# Patient Record
Sex: FEMALE | Race: WHITE | NOT HISPANIC OR LATINO | Employment: UNEMPLOYED | URBAN - METROPOLITAN AREA
[De-identification: names, ages, dates, MRNs, and addresses within clinical notes are randomized per-mention and may not be internally consistent; named-entity substitution may affect disease eponyms.]

---

## 2017-07-27 ENCOUNTER — HISTORICAL (OUTPATIENT)
Dept: LAB | Facility: HOSPITAL | Age: 47
End: 2017-07-27

## 2017-07-27 LAB
ABS NEUT (OLG): 2.3 X10(3)/MCL (ref 1.5–6.9)
ALBUMIN SERPL-MCNC: 4 GM/DL (ref 3.4–5)
ALBUMIN/GLOB SERPL: 1.2 RATIO
ALP SERPL-CCNC: 71 UNIT/L (ref 30–113)
ALT SERPL-CCNC: 19 UNIT/L (ref 10–45)
AST SERPL-CCNC: 14 UNIT/L (ref 15–37)
BILIRUB SERPL-MCNC: 0.4 MG/DL (ref 0.1–0.9)
BILIRUBIN DIRECT+TOT PNL SERPL-MCNC: 0.1 MG/DL (ref 0–0.3)
BILIRUBIN DIRECT+TOT PNL SERPL-MCNC: 0.3 MG/DL
BUN SERPL-MCNC: 13 MG/DL (ref 10–20)
CALCIUM SERPL-MCNC: 8.7 MG/DL (ref 8–10.5)
CHLORIDE SERPL-SCNC: 107 MMOL/L (ref 100–108)
CHOLEST SERPL-MCNC: 175 MG/DL (ref 140–200)
CHOLEST/HDLC SERPL: 4 MG/DL (ref 35–59)
CO2 SERPL-SCNC: 25 MMOL/L (ref 21–35)
CREAT SERPL-MCNC: 0.46 MG/DL (ref 0.7–1.3)
ERYTHROCYTE [DISTWIDTH] IN BLOOD BY AUTOMATED COUNT: 13.9 % (ref 11.5–17)
FT4I SERPL CALC-MCNC: 2.8 (ref 5.9–13.1)
GLOBULIN SER-MCNC: 3.4 GM/DL
GLUCOSE SERPL-MCNC: 87 MG/DL (ref 75–116)
HCT VFR BLD AUTO: 31.2 % (ref 36–48)
HDLC SERPL-MCNC: 47 MG/DL (ref 35–59)
HGB BLD-MCNC: 9.1 GM/DL (ref 12–16)
LDLC SERPL CALC-MCNC: 113 MG/DL (ref 100–129)
MCH RBC QN AUTO: 23 PG (ref 27–34)
MCHC RBC AUTO-ENTMCNC: 29 GM/DL (ref 31–36)
MCV RBC AUTO: 77 FL (ref 80–99)
PLATELET # BLD AUTO: 315 X10(3)/MCL (ref 140–400)
PMV BLD AUTO: 10.6 FL (ref 6.8–10)
POTASSIUM SERPL-SCNC: 4.6 MMOL/L (ref 3.6–5.2)
PROT SERPL-MCNC: 7.4 GM/DL (ref 6.4–8.2)
RBC # BLD AUTO: 4.03 X10(6)/MCL (ref 4.2–5.4)
SODIUM SERPL-SCNC: 142 MMOL/L (ref 135–145)
T3RU NFR SERPL: 37 % (ref 30–39)
T4 SERPL-MCNC: 7.5 MCG/DL (ref 5.3–11.5)
TRIGL SERPL-MCNC: 57 MG/DL (ref 35–150)
TSH SERPL-ACNC: 0.92 MIU/ML (ref 0.36–3.74)
VLDLC SERPL CALC-MCNC: 11 MG/DL
WBC # SPEC AUTO: 4 X10(3)/MCL (ref 4.5–11.5)

## 2017-08-10 ENCOUNTER — HISTORICAL (OUTPATIENT)
Dept: RADIOLOGY | Facility: HOSPITAL | Age: 47
End: 2017-08-10

## 2018-08-07 ENCOUNTER — HISTORICAL (OUTPATIENT)
Dept: RADIOLOGY | Facility: HOSPITAL | Age: 48
End: 2018-08-07

## 2018-08-27 ENCOUNTER — HISTORICAL (OUTPATIENT)
Dept: LAB | Facility: HOSPITAL | Age: 48
End: 2018-08-27

## 2018-08-27 LAB
ABS NEUT (OLG): 2.4 X10(3)/MCL (ref 1.5–6.9)
ALBUMIN SERPL-MCNC: 4.1 GM/DL (ref 3.4–5)
ALBUMIN/GLOB SERPL: 1.1 RATIO
ALP SERPL-CCNC: 63 UNIT/L (ref 30–113)
ALT SERPL-CCNC: 30 UNIT/L (ref 10–45)
AST SERPL-CCNC: 24 UNIT/L (ref 15–37)
BILIRUB SERPL-MCNC: 0.5 MG/DL (ref 0.1–0.9)
BILIRUBIN DIRECT+TOT PNL SERPL-MCNC: 0.1 MG/DL (ref 0–0.3)
BILIRUBIN DIRECT+TOT PNL SERPL-MCNC: 0.4 MG/DL
BUN SERPL-MCNC: 16 MG/DL (ref 10–20)
CALCIUM SERPL-MCNC: 9.7 MG/DL (ref 8–10.5)
CHLORIDE SERPL-SCNC: 105 MMOL/L (ref 100–108)
CHOLEST SERPL-MCNC: 177 MG/DL (ref 140–200)
CHOLEST/HDLC SERPL: 3 MG/DL (ref 0–8)
CO2 SERPL-SCNC: 29 MMOL/L (ref 21–35)
CREAT SERPL-MCNC: 0.47 MG/DL (ref 0.7–1.3)
ERYTHROCYTE [DISTWIDTH] IN BLOOD BY AUTOMATED COUNT: 12.1 % (ref 11.5–17)
GLOBULIN SER-MCNC: 3.8 GM/DL
GLUCOSE SERPL-MCNC: 87 MG/DL (ref 75–116)
HCT VFR BLD AUTO: 42.5 % (ref 36–48)
HDLC SERPL-MCNC: 51 MG/DL (ref 35–59)
HGB BLD-MCNC: 14.4 GM/DL (ref 12–16)
LDLC SERPL CALC-MCNC: 116 MG/DL (ref 100–129)
MCH RBC QN AUTO: 30 PG (ref 27–34)
MCHC RBC AUTO-ENTMCNC: 34 GM/DL (ref 31–36)
MCV RBC AUTO: 90 FL (ref 80–99)
PLATELET # BLD AUTO: 236 X10(3)/MCL (ref 140–400)
PMV BLD AUTO: 10.6 FL (ref 6.8–10)
POTASSIUM SERPL-SCNC: 5 MMOL/L (ref 3.6–5.2)
PROT SERPL-MCNC: 7.9 GM/DL (ref 6.4–8.2)
RBC # BLD AUTO: 4.75 X10(6)/MCL (ref 4.2–5.4)
SODIUM SERPL-SCNC: 142 MMOL/L (ref 135–145)
TRIGL SERPL-MCNC: 135 MG/DL (ref 35–150)
TSH SERPL-ACNC: 0.65 MIU/ML (ref 0.36–3.74)
VLDLC SERPL CALC-MCNC: 27 MG/DL
WBC # SPEC AUTO: 4.5 X10(3)/MCL (ref 4.5–11.5)

## 2018-09-11 ENCOUNTER — HISTORICAL (OUTPATIENT)
Dept: RADIOLOGY | Facility: HOSPITAL | Age: 48
End: 2018-09-11

## 2018-09-24 ENCOUNTER — HISTORICAL (OUTPATIENT)
Dept: RADIOLOGY | Facility: HOSPITAL | Age: 48
End: 2018-09-24

## 2019-07-24 ENCOUNTER — HISTORICAL (OUTPATIENT)
Dept: LAB | Facility: HOSPITAL | Age: 49
End: 2019-07-24

## 2019-07-24 LAB
ABS NEUT (OLG): 2.3 X10(3)/MCL (ref 1.5–6.9)
APPEARANCE, UA: CLEAR
APTT PPP: 25.9 SECOND(S) (ref 25–35)
BACTERIA SPEC CULT: NORMAL /HPF
BILIRUB UR QL STRIP: NEGATIVE
BUN SERPL-MCNC: 16 MG/DL (ref 10–20)
CALCIUM SERPL-MCNC: 9.2 MG/DL (ref 8–10.5)
CHLORIDE SERPL-SCNC: 106 MMOL/L (ref 100–108)
CO2 SERPL-SCNC: 30 MMOL/L (ref 21–35)
COLOR UR: ABNORMAL
CREAT SERPL-MCNC: 0.59 MG/DL (ref 0.7–1.3)
CREAT/UREA NIT SERPL: 27
ERYTHROCYTE [DISTWIDTH] IN BLOOD BY AUTOMATED COUNT: 11.7 % (ref 11.5–17)
GLUCOSE (UA): NEGATIVE
GLUCOSE SERPL-MCNC: 82 MG/DL (ref 75–116)
HCT VFR BLD AUTO: 41.2 % (ref 36–48)
HGB BLD-MCNC: 13.7 GM/DL (ref 12–16)
HGB UR QL STRIP: NEGATIVE
INR PPP: 1 (ref 0–1.2)
KETONES UR QL STRIP: NEGATIVE
LEUKOCYTE ESTERASE UR QL STRIP: ABNORMAL
MCH RBC QN AUTO: 31 PG (ref 27–34)
MCHC RBC AUTO-ENTMCNC: 33 GM/DL (ref 31–36)
MCV RBC AUTO: 92 FL (ref 80–99)
NITRITE UR QL STRIP: NEGATIVE
PH UR STRIP: 6.5 [PH]
PLATELET # BLD AUTO: 201 X10(3)/MCL (ref 140–400)
PMV BLD AUTO: 10 FL (ref 6.8–10)
POTASSIUM SERPL-SCNC: 5 MMOL/L (ref 3.6–5.2)
PROT UR QL STRIP: NEGATIVE
PROTHROMBIN TIME: 10 SECOND(S) (ref 9–12)
RBC # BLD AUTO: 4.47 X10(6)/MCL (ref 4.2–5.4)
RBC #/AREA URNS HPF: NORMAL /[HPF]
SODIUM SERPL-SCNC: 143 MMOL/L (ref 135–145)
SP GR UR STRIP: 1.02
SQUAMOUS EPITHELIAL, UA: NORMAL
UROBILINOGEN UR STRIP-ACNC: 0.2 EU/DL
WBC # SPEC AUTO: 4.4 X10(3)/MCL (ref 4.5–11.5)
WBC #/AREA URNS HPF: NORMAL /[HPF]

## 2019-11-26 ENCOUNTER — HISTORICAL (OUTPATIENT)
Dept: RADIOLOGY | Facility: HOSPITAL | Age: 49
End: 2019-11-26

## 2020-12-10 ENCOUNTER — HISTORICAL (OUTPATIENT)
Dept: LAB | Facility: HOSPITAL | Age: 50
End: 2020-12-10

## 2020-12-10 LAB
ABS NEUT (OLG): 3.3 X10(3)/MCL (ref 1.5–6.9)
ALBUMIN SERPL-MCNC: 4.5 GM/DL (ref 3.5–5)
ALBUMIN/GLOB SERPL: 1.4 RATIO (ref 1.1–2)
ALP SERPL-CCNC: 54 UNIT/L (ref 40–150)
ALT SERPL-CCNC: 58 UNIT/L (ref 0–55)
AST SERPL-CCNC: 31 UNIT/L (ref 5–34)
BASOPHILS # BLD AUTO: 0 X10(3)/MCL (ref 0–0.1)
BASOPHILS NFR BLD AUTO: 0 % (ref 0–1)
BILIRUB SERPL-MCNC: 0.5 MG/DL
BILIRUBIN DIRECT+TOT PNL SERPL-MCNC: 0.2 MG/DL (ref 0–0.5)
BILIRUBIN DIRECT+TOT PNL SERPL-MCNC: 0.3 MG/DL (ref 0–0.8)
BUN SERPL-MCNC: 11 MG/DL (ref 9.8–20.1)
CALCIUM SERPL-MCNC: 9.5 MG/DL (ref 8.4–10.2)
CHLORIDE SERPL-SCNC: 105 MMOL/L (ref 98–107)
CO2 SERPL-SCNC: 29 MMOL/L (ref 22–29)
CREAT SERPL-MCNC: 0.56 MG/DL (ref 0.55–1.02)
EOSINOPHIL # BLD AUTO: 0.2 X10(3)/MCL (ref 0–0.6)
EOSINOPHIL NFR BLD AUTO: 3 % (ref 0–5)
ERYTHROCYTE [DISTWIDTH] IN BLOOD BY AUTOMATED COUNT: 11.9 % (ref 11.5–17)
FOLATE SERPL-MCNC: 15.4 NG/ML (ref 7–31.4)
GLOBULIN SER-MCNC: 3.3 GM/DL (ref 2.4–3.5)
GLUCOSE SERPL-MCNC: 89 MG/DL (ref 74–100)
HCT VFR BLD AUTO: 45 % (ref 36–48)
HGB BLD-MCNC: 14.5 GM/DL (ref 12–16)
IMM GRANULOCYTES # BLD AUTO: 0.05 10*3/UL (ref 0–0.02)
IMM GRANULOCYTES NFR BLD AUTO: 0.9 % (ref 0–0.43)
LYMPHOCYTES # BLD AUTO: 1.6 X10(3)/MCL (ref 0.5–4.1)
LYMPHOCYTES NFR BLD AUTO: 29 % (ref 15–40)
MCH RBC QN AUTO: 30 PG (ref 27–34)
MCHC RBC AUTO-ENTMCNC: 32 GM/DL (ref 31–36)
MCV RBC AUTO: 92 FL (ref 80–99)
MONOCYTES # BLD AUTO: 0.4 X10(3)/MCL (ref 0–1.1)
MONOCYTES NFR BLD AUTO: 7 % (ref 4–12)
NEUTROPHILS # BLD AUTO: 3.3 X10(3)/MCL (ref 1.5–6.9)
NEUTROPHILS NFR BLD AUTO: 59 % (ref 43–75)
PLATELET # BLD AUTO: 244 X10(3)/MCL (ref 140–400)
PMV BLD AUTO: 9.6 FL (ref 6.8–10)
POTASSIUM SERPL-SCNC: 5 MMOL/L (ref 3.5–5.1)
PROT SERPL-MCNC: 7.8 GM/DL (ref 6.4–8.3)
RBC # BLD AUTO: 4.87 X10(6)/MCL (ref 4.2–5.4)
SODIUM SERPL-SCNC: 142 MMOL/L (ref 136–145)
WBC # SPEC AUTO: 5.6 X10(3)/MCL (ref 4.5–11.5)

## 2021-01-28 ENCOUNTER — HISTORICAL (OUTPATIENT)
Dept: RADIOLOGY | Facility: HOSPITAL | Age: 51
End: 2021-01-28

## 2021-02-01 ENCOUNTER — HISTORICAL (OUTPATIENT)
Dept: RADIOLOGY | Facility: HOSPITAL | Age: 51
End: 2021-02-01

## 2021-03-24 ENCOUNTER — HISTORICAL (OUTPATIENT)
Dept: RADIOLOGY | Facility: HOSPITAL | Age: 51
End: 2021-03-24

## 2022-03-09 ENCOUNTER — HISTORICAL (OUTPATIENT)
Dept: ADMINISTRATIVE | Facility: HOSPITAL | Age: 52
End: 2022-03-09

## 2022-03-09 ENCOUNTER — HISTORICAL (OUTPATIENT)
Dept: RADIOLOGY | Facility: HOSPITAL | Age: 52
End: 2022-03-09

## 2022-03-28 ENCOUNTER — HISTORICAL (OUTPATIENT)
Dept: RADIOLOGY | Facility: HOSPITAL | Age: 52
End: 2022-03-28

## 2022-08-25 ENCOUNTER — HOSPITAL ENCOUNTER (OUTPATIENT)
Dept: RADIOLOGY | Facility: HOSPITAL | Age: 52
Discharge: HOME | End: 2022-08-25
Attending: NURSE PRACTITIONER
Payer: MEDICAID

## 2022-08-25 DIAGNOSIS — M25.551 PAIN IN RIGHT HIP: ICD-10-CM

## 2022-08-25 PROCEDURE — 73502 X-RAY EXAM HIP UNI 2-3 VIEWS: CPT | Mod: TC,RT

## 2022-08-29 ENCOUNTER — HOSPITAL ENCOUNTER (EMERGENCY)
Facility: HOSPITAL | Age: 52
Discharge: HOME | End: 2022-08-29
Attending: EMERGENCY MEDICINE
Payer: MEDICAID

## 2022-08-29 VITALS
OXYGEN SATURATION: 100 % | HEART RATE: 78 BPM | HEIGHT: 69 IN | BODY MASS INDEX: 18.66 KG/M2 | TEMPERATURE: 99 F | SYSTOLIC BLOOD PRESSURE: 125 MMHG | RESPIRATION RATE: 17 BRPM | DIASTOLIC BLOOD PRESSURE: 78 MMHG | WEIGHT: 126 LBS

## 2022-08-29 DIAGNOSIS — M25.551 RIGHT HIP PAIN: Primary | ICD-10-CM

## 2022-08-29 DIAGNOSIS — S70.01XA CONTUSION OF RIGHT HIP, INITIAL ENCOUNTER: ICD-10-CM

## 2022-08-29 LAB
ALBUMIN SERPL-MCNC: 4.7 GM/DL (ref 3.5–5)
ALBUMIN/GLOB SERPL: 1.6 RATIO (ref 1.1–2)
ALP SERPL-CCNC: 51 UNIT/L (ref 40–150)
ALT SERPL-CCNC: 18 UNIT/L (ref 0–55)
APPEARANCE UR: CLEAR
AST SERPL-CCNC: 21 UNIT/L (ref 5–34)
BACTERIA #/AREA URNS AUTO: ABNORMAL /HPF
BASOPHILS # BLD AUTO: 0.02 X10(3)/MCL (ref 0–0.2)
BASOPHILS NFR BLD AUTO: 0.4 %
BILIRUB UR QL STRIP.AUTO: NEGATIVE MG/DL
BILIRUBIN DIRECT+TOT PNL SERPL-MCNC: 0.6 MG/DL
BUN SERPL-MCNC: 14 MG/DL (ref 9.8–20.1)
CALCIUM SERPL-MCNC: 9.8 MG/DL (ref 8.4–10.2)
CHLORIDE SERPL-SCNC: 109 MMOL/L (ref 98–107)
CO2 SERPL-SCNC: 24 MMOL/L (ref 22–29)
COLOR UR AUTO: YELLOW
CREAT SERPL-MCNC: 0.54 MG/DL (ref 0.55–1.02)
EOSINOPHIL # BLD AUTO: 0.17 X10(3)/MCL (ref 0–0.9)
EOSINOPHIL NFR BLD AUTO: 3.3 %
ERYTHROCYTE [DISTWIDTH] IN BLOOD BY AUTOMATED COUNT: 12.4 % (ref 11.5–17)
ERYTHROCYTE [SEDIMENTATION RATE] IN BLOOD: 14 MM/HR (ref 0–20)
GFR SERPLBLD CREATININE-BSD FMLA CKD-EPI: >60 MLS/MIN/1.73/M2
GLOBULIN SER-MCNC: 2.9 GM/DL (ref 2.4–3.5)
GLUCOSE SERPL-MCNC: 93 MG/DL (ref 74–100)
GLUCOSE UR QL STRIP.AUTO: NEGATIVE MG/DL
HCT VFR BLD AUTO: 37.5 % (ref 37–47)
HGB BLD-MCNC: 12.5 GM/DL (ref 12–16)
IMM GRANULOCYTES # BLD AUTO: 0.03 X10(3)/MCL (ref 0–0.04)
IMM GRANULOCYTES NFR BLD AUTO: 0.6 %
KETONES UR QL STRIP.AUTO: NEGATIVE MG/DL
LEUKOCYTE ESTERASE UR QL STRIP.AUTO: ABNORMAL UNIT/L
LYMPHOCYTES # BLD AUTO: 1.85 X10(3)/MCL (ref 0.6–4.6)
LYMPHOCYTES NFR BLD AUTO: 35.4 %
MCH RBC QN AUTO: 29.7 PG (ref 27–31)
MCHC RBC AUTO-ENTMCNC: 33.3 MG/DL (ref 33–36)
MCV RBC AUTO: 89.1 FL (ref 80–94)
MONOCYTES # BLD AUTO: 0.36 X10(3)/MCL (ref 0.1–1.3)
MONOCYTES NFR BLD AUTO: 6.9 %
MUCOUS THREADS URNS QL MICRO: ABNORMAL /LPF
NEUTROPHILS # BLD AUTO: 2.8 X10(3)/MCL (ref 2.1–9.2)
NEUTROPHILS NFR BLD AUTO: 53.4 %
NITRITE UR QL STRIP.AUTO: NEGATIVE
PH UR STRIP.AUTO: 5.5 [PH]
PLATELET # BLD AUTO: 202 X10(3)/MCL (ref 130–400)
PMV BLD AUTO: 9.8 FL (ref 7.4–10.4)
POTASSIUM SERPL-SCNC: 4.9 MMOL/L (ref 3.5–5.1)
PROT SERPL-MCNC: 7.6 GM/DL (ref 6.4–8.3)
PROT UR QL STRIP.AUTO: NEGATIVE MG/DL
RBC # BLD AUTO: 4.21 X10(6)/MCL (ref 4.2–5.4)
RBC #/AREA URNS AUTO: ABNORMAL /HPF
RBC UR QL AUTO: NEGATIVE UNIT/L
SODIUM SERPL-SCNC: 144 MMOL/L (ref 136–145)
SP GR UR STRIP.AUTO: >=1.03
SQUAMOUS #/AREA URNS AUTO: ABNORMAL /HPF
UROBILINOGEN UR STRIP-ACNC: 0.2 MG/DL
WBC # SPEC AUTO: 5.2 X10(3)/MCL (ref 4.5–11.5)
WBC #/AREA URNS AUTO: ABNORMAL /HPF

## 2022-08-29 PROCEDURE — 80053 COMPREHEN METABOLIC PANEL: CPT | Performed by: NURSE PRACTITIONER

## 2022-08-29 PROCEDURE — 99285 EMERGENCY DEPT VISIT HI MDM: CPT | Mod: 25

## 2022-08-29 PROCEDURE — 36415 COLL VENOUS BLD VENIPUNCTURE: CPT | Performed by: NURSE PRACTITIONER

## 2022-08-29 PROCEDURE — 81001 URINALYSIS AUTO W/SCOPE: CPT | Performed by: NURSE PRACTITIONER

## 2022-08-29 PROCEDURE — 85651 RBC SED RATE NONAUTOMATED: CPT | Performed by: NURSE PRACTITIONER

## 2022-08-29 PROCEDURE — 85025 COMPLETE CBC W/AUTO DIFF WBC: CPT | Performed by: NURSE PRACTITIONER

## 2022-08-29 RX ORDER — HYDROCODONE BITARTRATE AND ACETAMINOPHEN 5; 325 MG/1; MG/1
1 TABLET ORAL EVERY 4 HOURS PRN
Qty: 18 TABLET | Refills: 0 | OUTPATIENT
Start: 2022-08-29 | End: 2023-08-18

## 2022-08-29 NOTE — ED PROVIDER NOTES
"     Source of History:  Patient    Chief complaint:  Hip Pain (States she had a Rt hip replacement done when she was 38 and 3 weeks ago she fell and the hip been hurting. Had xray done few days ago here per PCP)      HPI:  Diana Sesay is a 52 y.o. female presenting with left hip pain.  Patient states this pain started approximately Thursday of last week.  She reports this started after having a dental procedure which should tooth was removed.  Has history of total hip replacement at age 38.  Denies any significant injury except for ice which she said she did fall against the bed but otherwise no other injury.    This is the extent to the patients complaints today here in the emergency department.    ROS: As per HPI and below:  General: No fever.  No chills.  Eyes: No visual changes.  ENT: No sore throat. No ear pain  Head: No headache.    Chest: No shortness of breath. No cough.  Cardiovascular: No chest pain.  Abdomen: No abdominal pain.  No nausea or vomiting.  Genito-Urinary: No abnormal urination.  Neurologic: No focal weakness.  No numbness.  MSK:  Right hip pain.  Integument: No rashes or lesions.  Psych: No confusion      Review of patient's allergies indicates:   Allergen Reactions    Sudafed cold-allergy        PMH:  As per HPI and below:  No past medical history on file.  No past surgical history on file.    Social History     Tobacco Use    Smoking status: Never    Smokeless tobacco: Never       Physical Exam:    /78   Pulse 78   Temp 99.1 °F (37.3 °C) (Oral)   Resp 17   Ht 5' 9" (1.753 m)   Wt 57.2 kg (126 lb)   SpO2 100%   BMI 18.61 kg/m²   Nursing note and vital signs reviewed.  Appearance: Afebrile. Not toxic appearing. No acute distress.  Head: Atraumatic  Eyes: No conjunctival injection. No scleral icterus  ENT: Normal phonation  Chest/ Respiratory: No respiratory distress. No accessory muscle use.  Cardiovascular: Regular rate   Abdomen:  Not distended.    Musculoskeletal: Good " range of motion all joints.  No deformities.  Neck supple.  No meningismus.  Pain with weight-bearing to the right hip.  Skin: No rashes seen.  Good turgor.  No ecchymoses.  No swelling, no erythema.  Neurologic: GCS 15. Ambulates with a steady gait.   Mental Status:  Alert and oriented x 3.  Appropriate, conversant    Labs that have been ordered have been independently reviewed and interpreted by myself.    I decided to obtain the patient's medical records.  Summary of Medical Records:  Nursing documentation    Initial Impression/ Differential Dx:  Right hip fracture, right hip arthritis, right hip dislocation.    MDM:    52 y.o. female with right hip pain after a fall presents emerged from for evaluation.  Patient states that she had dizzy and fell with her right hip landing on the frame of the bed.  She states it bruise at the time and did not hurt so much but she reports after having a tooth removed that the pain began in the hip.  She saw her PCP who did x-rays but was sent here for further evaluation.       Imaging Results              CT Hip Without Contrast Right (Final result)  Result time 08/29/22 18:01:15      Final result by Collin Mcgovern MD (08/29/22 18:01:15)                   Impression:      1. There is nonspecific demineralization at the lateral margin of the proximal portion of the femoral component of the prosthesis.  2. Please note there is beam hardening artifact from the prosthesis which could obscure a focal osseous finding.  3. Orthopedic follow-up is recommended.      Electronically signed by: Collin Mcgovern MD  Date:    08/29/2022  Time:    18:01               Narrative:    EXAMINATION:  CT HIP WITHOUT CONTRAST RIGHT    CLINICAL HISTORY:  Hip pain, stress fracture suspected, neg xray;    TECHNIQUE:  Axial, helical imaging of the right hip was performed.  Axial, sagittal and coronal images were generated.  Soft tissue and bone algorithms were applied.  Automated exposure control was  utilized to limit radiation exposure to the patient.   Total DLP for this study was 399 mgycm.    COMPARISON:  Radiographs of the right hip dated 8/29/22 and 08/25/2022.    FINDINGS:  A right hip prosthesis appears in satisfactory position.  There is beam hardening artifact from the prosthesis.    There is nonspecific demineralization at the lateral margin of the proximal portion of the femoral component of the prosthesis.    There is no displaced fracture or dislocation.  There is no abnormal fluid collection.  Alignment is within normal limits.                                       X-Ray Hip 2 or 3 views Right (with Pelvis when performed) (Final result)  Result time 08/29/22 16:41:59      Final result by Joshua Luz MD (08/29/22 16:41:59)                   Impression:      1. Multiple calcifications again evident at the right hip joint with right hip prosthetic  2. Small focal lucency again adjacent to the upper femoral component of the right hip prosthetic      Electronically signed by: Joshua Luz  Date:    08/29/2022  Time:    16:41               Narrative:    EXAMINATION:  XR HIP WITH PELVIS WHEN PERFORMED, 2 OR 3  VIEWS RIGHT    CLINICAL HISTORY:  ; fall;.    COMPARISON:  08/25/2022    FINDINGS:  AP and frogleg lateral views revealscattered calcifications again evident at the right hip joint prosthetic which have a similar appearance to the prior exam.  Mild lucency again evident at the proximal femoral component.  The left hip joint is grossly intact.                                    Labs Reviewed   COMPREHENSIVE METABOLIC PANEL - Abnormal; Notable for the following components:       Result Value    Chloride 109 (*)     Creatinine 0.54 (*)     All other components within normal limits   URINALYSIS, REFLEX TO URINE CULTURE - Abnormal; Notable for the following components:    Leukocyte Esterase, UA Trace (*)     All other components within normal limits   SEDIMENTATION RATE, AUTOMATED - Normal   CBC  W/ AUTO DIFFERENTIAL    Narrative:     The following orders were created for panel order CBC auto differential.  Procedure                               Abnormality         Status                     ---------                               -----------         ------                     CBC with Differential[[id number]]                            Final result                 Please view results for these tests on the individual orders.   CBC WITH DIFFERENTIAL   URINALYSIS, MICROSCOPIC                 Diagnostic Impression:    1. Right hip pain    2. Contusion of right hip, initial encounter         ED Disposition Condition    Discharge Stable            ED Prescriptions       Medication Sig Dispense Start Date End Date Auth. Provider    HYDROcodone-acetaminophen (NORCO) 5-325 mg per tablet Take 1 tablet by mouth every 4 (four) hours as needed for Pain. 18 tablet 8/29/2022 -- BRENNON Perez          Follow-up Information       Follow up With Specialties Details Why Contact Info    BREN[facility] Castillo [facility] Call in 1 week As needed, For ER Follow Up. [address] [facility] [place] [address]  [phone]               BRENNON Perez  08/29/22 7849

## 2023-01-11 DIAGNOSIS — M81.0 OSTEOPOROSIS: Primary | ICD-10-CM

## 2023-02-03 DIAGNOSIS — Z12.31 ENCOUNTER FOR SCREENING MAMMOGRAM FOR BREAST CANCER: Primary | ICD-10-CM

## 2023-03-16 ENCOUNTER — HOSPITAL ENCOUNTER (OUTPATIENT)
Dept: RADIOLOGY | Facility: HOSPITAL | Age: 53
Discharge: HOME | End: 2023-03-16
Attending: NURSE PRACTITIONER
Payer: MEDICAID

## 2023-03-16 DIAGNOSIS — Z12.31 ENCOUNTER FOR SCREENING MAMMOGRAM FOR BREAST CANCER: ICD-10-CM

## 2023-03-16 DIAGNOSIS — M81.0 OSTEOPOROSIS: ICD-10-CM

## 2023-03-16 PROCEDURE — 77067 SCR MAMMO BI INCL CAD: CPT | Mod: TC

## 2023-03-16 PROCEDURE — 77067 MAMMO DIGITAL SCREENING BILAT WITH TOMO: ICD-10-PCS | Mod: 26,,, | Performed by: STUDENT IN AN ORGANIZED HEALTH CARE EDUCATION/TRAINING PROGRAM

## 2023-03-16 PROCEDURE — 77063 BREAST TOMOSYNTHESIS BI: CPT | Mod: 26,,, | Performed by: STUDENT IN AN ORGANIZED HEALTH CARE EDUCATION/TRAINING PROGRAM

## 2023-03-16 PROCEDURE — 77067 SCR MAMMO BI INCL CAD: CPT | Mod: 26,,, | Performed by: STUDENT IN AN ORGANIZED HEALTH CARE EDUCATION/TRAINING PROGRAM

## 2023-03-16 PROCEDURE — 77063 MAMMO DIGITAL SCREENING BILAT WITH TOMO: ICD-10-PCS | Mod: 26,,, | Performed by: STUDENT IN AN ORGANIZED HEALTH CARE EDUCATION/TRAINING PROGRAM

## 2023-03-16 PROCEDURE — 77080 DXA BONE DENSITY AXIAL: CPT | Mod: TC

## 2023-08-09 ENCOUNTER — HOSPITAL ENCOUNTER (OUTPATIENT)
Dept: RADIOLOGY | Facility: HOSPITAL | Age: 53
Discharge: HOME | End: 2023-08-09
Attending: NURSE PRACTITIONER
Payer: MEDICAID

## 2023-08-09 DIAGNOSIS — S59.901A INJURY OF RIGHT ELBOW: ICD-10-CM

## 2023-08-09 PROCEDURE — 73080 X-RAY EXAM OF ELBOW: CPT | Mod: TC,RT

## 2023-08-11 DIAGNOSIS — M25.521 RIGHT ELBOW PAIN: Primary | ICD-10-CM

## 2023-08-18 ENCOUNTER — HOSPITAL ENCOUNTER (EMERGENCY)
Facility: HOSPITAL | Age: 53
Discharge: HOME | End: 2023-08-18
Attending: EMERGENCY MEDICINE
Payer: MEDICAID

## 2023-08-18 VITALS
DIASTOLIC BLOOD PRESSURE: 68 MMHG | BODY MASS INDEX: 19.93 KG/M2 | OXYGEN SATURATION: 99 % | TEMPERATURE: 98 F | RESPIRATION RATE: 16 BRPM | WEIGHT: 124 LBS | HEART RATE: 80 BPM | HEIGHT: 66 IN | SYSTOLIC BLOOD PRESSURE: 100 MMHG

## 2023-08-18 DIAGNOSIS — M54.31 SCIATICA OF RIGHT SIDE: Primary | ICD-10-CM

## 2023-08-18 DIAGNOSIS — M16.11 PRIMARY OSTEOARTHRITIS OF RIGHT HIP: ICD-10-CM

## 2023-08-18 PROBLEM — R51.9 GENERALIZED HEADACHE: Status: ACTIVE | Noted: 2023-08-18

## 2023-08-18 PROBLEM — S16.1XXA STRAIN OF NECK MUSCLE: Status: ACTIVE | Noted: 2023-08-18

## 2023-08-18 PROCEDURE — 99284 EMERGENCY DEPT VISIT MOD MDM: CPT

## 2023-08-18 PROCEDURE — 96372 THER/PROPH/DIAG INJ SC/IM: CPT | Performed by: NURSE PRACTITIONER

## 2023-08-18 PROCEDURE — 63600175 PHARM REV CODE 636 W HCPCS: Performed by: NURSE PRACTITIONER

## 2023-08-18 RX ORDER — HYDROCODONE BITARTRATE AND ACETAMINOPHEN 5; 325 MG/1; MG/1
1 TABLET ORAL EVERY 6 HOURS PRN
Qty: 12 TABLET | Refills: 0 | Status: SHIPPED | OUTPATIENT
Start: 2023-08-18

## 2023-08-18 RX ORDER — KETOROLAC TROMETHAMINE 30 MG/ML
30 INJECTION, SOLUTION INTRAMUSCULAR; INTRAVENOUS
Status: COMPLETED | OUTPATIENT
Start: 2023-08-18 | End: 2023-08-18

## 2023-08-18 RX ORDER — ORPHENADRINE CITRATE 30 MG/ML
60 INJECTION INTRAMUSCULAR; INTRAVENOUS
Status: COMPLETED | OUTPATIENT
Start: 2023-08-18 | End: 2023-08-18

## 2023-08-18 RX ORDER — BETAMETHASONE SODIUM PHOSPHATE AND BETAMETHASONE ACETATE 3; 3 MG/ML; MG/ML
12 INJECTION, SUSPENSION INTRA-ARTICULAR; INTRALESIONAL; INTRAMUSCULAR; SOFT TISSUE ONCE
Status: COMPLETED | OUTPATIENT
Start: 2023-08-18 | End: 2023-08-18

## 2023-08-18 RX ADMIN — BETAMETHASONE SODIUM PHOSPHATE AND BETAMETHASONE ACETATE 12 MG: 3; 3 INJECTION, SUSPENSION INTRA-ARTICULAR; INTRALESIONAL; INTRAMUSCULAR at 02:08

## 2023-08-18 RX ADMIN — ORPHENADRINE CITRATE 60 MG: 60 INJECTION INTRAMUSCULAR; INTRAVENOUS at 02:08

## 2023-08-18 RX ADMIN — KETOROLAC TROMETHAMINE 30 MG: 30 INJECTION, SOLUTION INTRAMUSCULAR; INTRAVENOUS at 02:08

## 2023-08-18 NOTE — ED PROVIDER NOTES
Encounter Date: 8/18/2023       History     Chief Complaint   Patient presents with    Hip Pain     Pt c/o rt hip pain x one week, denies injury. Pt states had rt hip replacement 15 years ago.     The patient is a 53 year old female with significant history of right hip replacement some 15 years ago, presents to the ED for evaluation and treatment of severe right hip pain/sciatica symptoms onset one week ago.  Atraumatic.  States she had a similar pain/event almost 1 year ago to the day for which she sought treatment in this ED.  States pain is a shooting pain that originates in her right lumbar region and travels laterally to her mid thigh/knee.  Painful but full ROM.  Was able to ambulate to her room, able to bear weight, no obvious deformities.  Denies any other complaints or conditions.      Review of patient's allergies indicates:   Allergen Reactions    Sudafed cold-allergy      History reviewed. No pertinent past medical history.  History reviewed. No pertinent surgical history.  History reviewed. No pertinent family history.  Social History     Tobacco Use    Smoking status: Never    Smokeless tobacco: Never     Review of Systems   All other systems reviewed and are negative.      Physical Exam     Initial Vitals [08/18/23 1242]   BP Pulse Resp Temp SpO2   94/60 80 18 97.7 °F (36.5 °C) 98 %      MAP       --         Physical Exam    Nursing note and vitals reviewed.  Constitutional: She appears well-developed and well-nourished.   HENT:   Head: Normocephalic and atraumatic.   Eyes: Conjunctivae are normal. Pupils are equal, round, and reactive to light.   Neck: Neck supple.   Normal range of motion.  Cardiovascular:  Normal rate and regular rhythm.           Pulmonary/Chest: Breath sounds normal.   Musculoskeletal:      Cervical back: Normal range of motion and neck supple.      Thoracic back: Normal.      Lumbar back: Spasms and tenderness present. No deformity or bony tenderness.      Right hip: Tenderness  and crepitus present. No bony tenderness.      Comments: Large palpable muscle spasms bilaterally, point tender along right sciatic notch, full ROM, no deformity     Neurological: She is alert and oriented to person, place, and time. She has normal strength. GCS score is 15. GCS eye subscore is 4. GCS verbal subscore is 5. GCS motor subscore is 6.   Psychiatric: She has a normal mood and affect. Her behavior is normal. Judgment and thought content normal.         ED Course   Procedures  Labs Reviewed - No data to display       Imaging Results              X-Ray Hip 2 or 3 views Right (with Pelvis when performed) (Final result)  Result time 08/18/23 13:30:42      Final result by Ever Ng MD (08/18/23 13:30:42)                   Impression:      No acute osseous process appreciated.      Electronically signed by: Ever Ng  Date:    08/18/2023  Time:    13:30               Narrative:    EXAMINATION:  XR HIP WITH PELVIS WHEN PERFORMED, 2 OR 3  VIEWS RIGHT    CLINICAL HISTORY:  hip pain;    TECHNIQUE:  AP view of the pelvis with frontal and frog leg lateral views of the right hip    COMPARISON:  Radiographs 08/29/2018    FINDINGS:  Prior right hip arthroplasty.  Right hip is aligned.  No acute fracture identified.                                       Medications   betamethasone acetate-betamethasone sodium phosphate injection 12 mg (12 mg Intramuscular Given 8/18/23 1438)   orphenadrine injection 60 mg (60 mg Intramuscular Given 8/18/23 1438)   ketorolac injection 30 mg (30 mg Intramuscular Given 8/18/23 1438)     Medical Decision Making  As described in HPI    DDX: hip fracture, osteoarthritis, sciatica, overuse    Reviewed notes from last year's visit    Acute on chronic today, x-ray reviewed negative for fracture, hardware in proper alignment, in place, significant arthritis.  Discussed results with the patient and her  in detail, overuse secondary to leg length discrepancy, previous osteoporosis  resolved with Prolia at this time, advised PT with possible shoe orthotic, muscle relaxer and NSAIDS, stretching, hydration, ergonomics.  Opiates with caution prn severe pain.    She will follow up with her PCP.    Amount and/or Complexity of Data Reviewed  External Data Reviewed: radiology and notes.  Radiology: ordered.     Details: No change from previous    Risk  Prescription drug management.                               Clinical Impression:   Final diagnoses:  [M54.31] Sciatica of right side (Primary)  [M16.11] Primary osteoarthritis of right hip        ED Disposition Condition    Discharge Stable          ED Prescriptions       Medication Sig Dispense Start Date End Date Auth. Provider    HYDROcodone-acetaminophen (NORCO) 5-325 mg per tablet Take 1 tablet by mouth every 6 (six) hours as needed for Pain. 12 tablet 8/18/2023 -- Hannah Reddy FNP          Follow-up Information       Follow up With Specialties Details Why Contact Info    Hannah Reddy FNP Family Medicine, Emergency Medicine In 3 days  [address] [facility] [place] [address]  [phone]               Hannah Reddy FNP  08/25/23 9049

## 2024-01-29 DIAGNOSIS — Z01.419 ENCOUNTER FOR ROUTINE GYNECOLOGICAL EXAMINATION: Primary | ICD-10-CM

## 2024-04-23 ENCOUNTER — HOSPITAL ENCOUNTER (OUTPATIENT)
Dept: RADIOLOGY | Facility: HOSPITAL | Age: 54
Discharge: HOME | End: 2024-04-23
Attending: NURSE PRACTITIONER
Payer: MEDICAID

## 2024-04-23 DIAGNOSIS — K59.00 CONSTIPATED: ICD-10-CM

## 2024-04-23 PROCEDURE — 74019 RADEX ABDOMEN 2 VIEWS: CPT | Mod: TC

## 2024-04-24 DIAGNOSIS — Z12.31 OTHER SCREENING MAMMOGRAM: Primary | ICD-10-CM

## 2024-05-06 ENCOUNTER — HOSPITAL ENCOUNTER (INPATIENT)
Facility: HOSPITAL | Age: 54
LOS: 4 days | Discharge: HOME | DRG: 885 | End: 2024-05-10
Attending: PSYCHIATRY & NEUROLOGY | Admitting: PSYCHIATRY & NEUROLOGY
Payer: MEDICAID

## 2024-05-06 ENCOUNTER — HOSPITAL ENCOUNTER (EMERGENCY)
Facility: HOSPITAL | Age: 54
Discharge: PSYCHIATRIC OR SUBSTANCE USE FACILITY | End: 2024-05-06
Attending: INTERNAL MEDICINE
Payer: MEDICAID

## 2024-05-06 VITALS
HEIGHT: 66 IN | BODY MASS INDEX: 19.29 KG/M2 | DIASTOLIC BLOOD PRESSURE: 72 MMHG | OXYGEN SATURATION: 99 % | RESPIRATION RATE: 15 BRPM | SYSTOLIC BLOOD PRESSURE: 113 MMHG | HEART RATE: 88 BPM | TEMPERATURE: 98 F | WEIGHT: 120 LBS

## 2024-05-06 DIAGNOSIS — F32.9 MAJOR DEPRESSION: ICD-10-CM

## 2024-05-06 DIAGNOSIS — Z00.8 MEDICAL CLEARANCE FOR PSYCHIATRIC ADMISSION: Primary | ICD-10-CM

## 2024-05-06 DIAGNOSIS — R45.851 SUICIDAL IDEATION: ICD-10-CM

## 2024-05-06 LAB
ALBUMIN SERPL-MCNC: 4.5 G/DL (ref 3.5–5)
ALBUMIN/GLOB SERPL: 1.6 RATIO (ref 1.1–2)
ALP SERPL-CCNC: 47 UNIT/L (ref 40–150)
ALT SERPL-CCNC: 16 UNIT/L (ref 0–55)
AMPHET UR QL SCN: NEGATIVE
APAP SERPL-MCNC: <17.4 UG/ML (ref 10–30)
APPEARANCE UR: CLEAR
AST SERPL-CCNC: 20 UNIT/L (ref 5–34)
BARBITURATE SCN PRESENT UR: NEGATIVE
BASOPHILS # BLD AUTO: 0.03 X10(3)/MCL
BASOPHILS NFR BLD AUTO: 0.7 %
BENZODIAZ UR QL SCN: NEGATIVE
BILIRUB SERPL-MCNC: 0.5 MG/DL
BILIRUB UR QL STRIP.AUTO: NEGATIVE
BUN SERPL-MCNC: 14 MG/DL (ref 9.8–20.1)
CALCIUM SERPL-MCNC: 9.2 MG/DL (ref 8.4–10.2)
CANNABINOIDS UR QL SCN: POSITIVE
CHLORIDE SERPL-SCNC: 108 MMOL/L (ref 98–107)
CO2 SERPL-SCNC: 26 MMOL/L (ref 22–29)
COCAINE UR QL SCN: NEGATIVE
COLOR UR AUTO: YELLOW
CREAT SERPL-MCNC: 0.6 MG/DL (ref 0.55–1.02)
EOSINOPHIL # BLD AUTO: 0.1 X10(3)/MCL (ref 0–0.9)
EOSINOPHIL NFR BLD AUTO: 2.4 %
ERYTHROCYTE [DISTWIDTH] IN BLOOD BY AUTOMATED COUNT: 12.1 % (ref 11.5–17)
ETHANOL SERPL-MCNC: <10 MG/DL
FENTANYL UR QL SCN: NEGATIVE
FLUAV AG UPPER RESP QL IA.RAPID: NOT DETECTED
FLUBV AG UPPER RESP QL IA.RAPID: NOT DETECTED
GFR SERPLBLD CREATININE-BSD FMLA CKD-EPI: >60 MLS/MIN/1.73/M2
GLOBULIN SER-MCNC: 2.8 GM/DL (ref 2.4–3.5)
GLUCOSE SERPL-MCNC: 92 MG/DL (ref 74–100)
GLUCOSE UR QL STRIP.AUTO: NEGATIVE
HCT VFR BLD AUTO: 41.1 % (ref 37–47)
HGB BLD-MCNC: 13.6 G/DL (ref 12–16)
IMM GRANULOCYTES # BLD AUTO: 0.02 X10(3)/MCL (ref 0–0.04)
IMM GRANULOCYTES NFR BLD AUTO: 0.5 %
KETONES UR QL STRIP.AUTO: NEGATIVE
LEUKOCYTE ESTERASE UR QL STRIP.AUTO: NEGATIVE
LYMPHOCYTES # BLD AUTO: 1.39 X10(3)/MCL (ref 0.6–4.6)
LYMPHOCYTES NFR BLD AUTO: 33.3 %
MCH RBC QN AUTO: 30.4 PG (ref 27–31)
MCHC RBC AUTO-ENTMCNC: 33.1 G/DL (ref 33–36)
MCV RBC AUTO: 91.7 FL (ref 80–94)
MDMA UR QL SCN: NEGATIVE
MONOCYTES # BLD AUTO: 0.31 X10(3)/MCL (ref 0.1–1.3)
MONOCYTES NFR BLD AUTO: 7.4 %
NEUTROPHILS # BLD AUTO: 2.32 X10(3)/MCL (ref 2.1–9.2)
NEUTROPHILS NFR BLD AUTO: 55.7 %
NITRITE UR QL STRIP.AUTO: NEGATIVE
OPIATES UR QL SCN: NEGATIVE
PCP UR QL: NEGATIVE
PH UR STRIP.AUTO: 6.5 [PH]
PH UR: 6.5 [PH] (ref 3–11)
PLATELET # BLD AUTO: 230 X10(3)/MCL (ref 130–400)
PMV BLD AUTO: 9.2 FL (ref 7.4–10.4)
POTASSIUM SERPL-SCNC: 3.7 MMOL/L (ref 3.5–5.1)
PROT SERPL-MCNC: 7.3 GM/DL (ref 6.4–8.3)
PROT UR QL STRIP.AUTO: NEGATIVE
RBC # BLD AUTO: 4.48 X10(6)/MCL (ref 4.2–5.4)
RBC UR QL AUTO: NEGATIVE
SARS-COV-2 RNA RESP QL NAA+PROBE: NOT DETECTED
SODIUM SERPL-SCNC: 139 MMOL/L (ref 136–145)
SP GR UR STRIP.AUTO: 1.01 (ref 1–1.03)
SPECIFIC GRAVITY, URINE AUTO (.000) (OHS): 1.01 (ref 1–1.03)
TSH SERPL-ACNC: 0.47 UIU/ML (ref 0.35–4.94)
UROBILINOGEN UR STRIP-ACNC: 1
WBC # SPEC AUTO: 4.17 X10(3)/MCL (ref 4.5–11.5)

## 2024-05-06 PROCEDURE — 82077 ASSAY SPEC XCP UR&BREATH IA: CPT | Performed by: INTERNAL MEDICINE

## 2024-05-06 PROCEDURE — 99285 EMERGENCY DEPT VISIT HI MDM: CPT

## 2024-05-06 PROCEDURE — 80143 DRUG ASSAY ACETAMINOPHEN: CPT | Performed by: INTERNAL MEDICINE

## 2024-05-06 PROCEDURE — 80307 DRUG TEST PRSMV CHEM ANLYZR: CPT | Performed by: INTERNAL MEDICINE

## 2024-05-06 PROCEDURE — 25000003 PHARM REV CODE 250: Performed by: INTERNAL MEDICINE

## 2024-05-06 PROCEDURE — 80053 COMPREHEN METABOLIC PANEL: CPT | Performed by: INTERNAL MEDICINE

## 2024-05-06 PROCEDURE — 25000003 PHARM REV CODE 250: Performed by: PSYCHIATRY & NEUROLOGY

## 2024-05-06 PROCEDURE — 12400001 HC PSYCH SEMI-PRIVATE ROOM

## 2024-05-06 PROCEDURE — 81003 URINALYSIS AUTO W/O SCOPE: CPT | Performed by: INTERNAL MEDICINE

## 2024-05-06 PROCEDURE — 0240U COVID/FLU A&B PCR: CPT | Performed by: INTERNAL MEDICINE

## 2024-05-06 PROCEDURE — 85025 COMPLETE CBC W/AUTO DIFF WBC: CPT | Performed by: INTERNAL MEDICINE

## 2024-05-06 PROCEDURE — 84443 ASSAY THYROID STIM HORMONE: CPT | Performed by: INTERNAL MEDICINE

## 2024-05-06 RX ORDER — LORAZEPAM 1 MG/1
2 TABLET ORAL EVERY 4 HOURS PRN
Status: DISCONTINUED | OUTPATIENT
Start: 2024-05-06 | End: 2024-05-10 | Stop reason: HOSPADM

## 2024-05-06 RX ORDER — CLONAZEPAM 0.5 MG/1
1 TABLET ORAL
Status: COMPLETED | OUTPATIENT
Start: 2024-05-06 | End: 2024-05-06

## 2024-05-06 RX ORDER — TRAZODONE HYDROCHLORIDE 100 MG/1
100 TABLET ORAL NIGHTLY PRN
Status: DISCONTINUED | OUTPATIENT
Start: 2024-05-06 | End: 2024-05-10 | Stop reason: HOSPADM

## 2024-05-06 RX ORDER — DIPHENHYDRAMINE HYDROCHLORIDE 50 MG/ML
50 INJECTION INTRAMUSCULAR; INTRAVENOUS EVERY 4 HOURS PRN
Status: DISCONTINUED | OUTPATIENT
Start: 2024-05-06 | End: 2024-05-10 | Stop reason: HOSPADM

## 2024-05-06 RX ORDER — HALOPERIDOL 5 MG/ML
10 INJECTION INTRAMUSCULAR EVERY 4 HOURS PRN
Status: DISCONTINUED | OUTPATIENT
Start: 2024-05-06 | End: 2024-05-10 | Stop reason: HOSPADM

## 2024-05-06 RX ORDER — DIPHENHYDRAMINE HCL 50 MG
50 CAPSULE ORAL EVERY 4 HOURS PRN
Status: DISCONTINUED | OUTPATIENT
Start: 2024-05-06 | End: 2024-05-10 | Stop reason: HOSPADM

## 2024-05-06 RX ORDER — ACETAMINOPHEN 325 MG/1
650 TABLET ORAL EVERY 6 HOURS PRN
Status: DISCONTINUED | OUTPATIENT
Start: 2024-05-06 | End: 2024-05-10 | Stop reason: HOSPADM

## 2024-05-06 RX ORDER — LORAZEPAM 2 MG/ML
2 INJECTION INTRAMUSCULAR EVERY 4 HOURS PRN
Status: DISCONTINUED | OUTPATIENT
Start: 2024-05-06 | End: 2024-05-10 | Stop reason: HOSPADM

## 2024-05-06 RX ORDER — IBUPROFEN 200 MG
1 TABLET ORAL DAILY
Status: DISCONTINUED | OUTPATIENT
Start: 2024-05-07 | End: 2024-05-07

## 2024-05-06 RX ORDER — HYDROXYZINE HYDROCHLORIDE 50 MG/1
50 TABLET, FILM COATED ORAL EVERY 4 HOURS PRN
Status: DISCONTINUED | OUTPATIENT
Start: 2024-05-06 | End: 2024-05-10 | Stop reason: HOSPADM

## 2024-05-06 RX ORDER — HALOPERIDOL 5 MG/1
10 TABLET ORAL EVERY 4 HOURS PRN
Status: DISCONTINUED | OUTPATIENT
Start: 2024-05-06 | End: 2024-05-10 | Stop reason: HOSPADM

## 2024-05-06 RX ORDER — ALUMINUM HYDROXIDE, MAGNESIUM HYDROXIDE, AND SIMETHICONE 1200; 120; 1200 MG/30ML; MG/30ML; MG/30ML
30 SUSPENSION ORAL EVERY 6 HOURS PRN
Status: DISCONTINUED | OUTPATIENT
Start: 2024-05-06 | End: 2024-05-10 | Stop reason: HOSPADM

## 2024-05-06 RX ADMIN — TRAZODONE HYDROCHLORIDE 100 MG: 100 TABLET ORAL at 08:05

## 2024-05-06 RX ADMIN — ACETAMINOPHEN 650 MG: 325 TABLET, FILM COATED ORAL at 09:05

## 2024-05-06 RX ADMIN — CLONAZEPAM 1 MG: 0.5 TABLET ORAL at 12:05

## 2024-05-06 RX ADMIN — HYDROXYZINE HYDROCHLORIDE 50 MG: 50 TABLET, FILM COATED ORAL at 08:05
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
## 2024-05-06 NOTE — ED PROVIDER NOTES
Source of History:  Patient, no limitations    Chief complaint:  Suicidal (States she been suicidal for the last 3 years, plan is to take pills)      HPI:  Diana Sesay is a 54 y.o. female presenting with Suicidal (States she been suicidal for the last 3 years, plan is to take pills)       Severe depression with suicidal thoughts  Patient presents with depression and suicidal thoughts/threats. Onset of symptoms was gradual starting several days ago.  Symptoms are of severe severity and are rapidly worsening.  Patient states symptoms have been exacerbated by relationship problem with children: Daughter. Symptoms are associated with intent to harm self OD on pills and 0 previous mental health hospitalizations. History obtained from: patient and spouse/SO. Care prior to arrival consisted of nothing        Review of Systems   Constitutional symptoms:  Negative except as documented in HPI.   Skin symptoms:  Negative except as documented in HPI.   HEENT symptoms:  Negative except as documented in HPI.   Respiratory symptoms:  Negative except as documented in HPI.   Cardiovascular symptoms:  Negative except as documented in HPI.   Gastrointestinal symptoms:  Negative except as documented in HPI.    Genitourinary symptoms:  Negative except as documented in HPI.   Musculoskeletal symptoms:  Negative except as documented in HPI.   Neurologic symptoms:  Negative except as documented in HPI.   Psychiatric symptoms:  Negative except as documented in HPI.   Allergy/immunologic symptoms:  Negative except as documented in HPI.             Additional review of systems information: All other systems reviewed and otherwise negative.      Review of patient's allergies indicates:   Allergen Reactions    Sudafed cold-allergy        PMH:  As per HPI and below:    No past medical history on file.     No family history on file.    No past surgical history on file.    Social History     Tobacco Use    Smoking status: Never     "Smokeless tobacco: Never       Patient Active Problem List   Diagnosis    Generalized headache    Strain of neck muscle    Sciatica of right side        Physical Exam:    /79   Pulse 92   Temp 98 °F (36.7 °C) (Oral)   Resp 15   Ht 5' 6" (1.676 m)   Wt 54.4 kg (120 lb)   SpO2 96%   BMI 19.37 kg/m²     Nursing note and vital signs reviewed.    General:  Alert, tearful  Skin: Normal for Ethnic Origin, No cyanosis  HEENT: Normocephalic and atraumatic, Vision unchanged, Pupils symmetric, No icterus , Nasal mucosa is pink and moist  Cardiovascular:  Regular rate and rhythm, No edema  Chest Wall: No deformity, equal chest rise  Respiratory:  Lungs are clear to auscultation, respirations are non-labored.    Musculoskeletal:  No deformity, Normal perfusion to all extremities  Gastrointestinal:  Soft, Non distended  Neurological:  Alert and oriented, normal motor observed, normal speech observed.    Psychiatric:  Cooperative, depressed mood & affect. +SI with plan        Labs that have been ordered have been independently reviewed and interpreted by myself.     Old Chart Reviewed.      Initial Impression/ Differential Dx:  Decompensated psychiatric disease (schizophrenia, bipolar disorder, major depression), excited delirium, medication noncompliance, substance abuse/withdrawal, intentional drug overdose, medication toxicity, APAP/ASA overdose, acute stress reaction, personality disorder, malingering, metabolic derangement      MDM:      Reviewed Nurses Note.    Reviewed Pertinent old records.    Orders Placed This Encounter    Urinalysis, Reflex to Urine Culture    Drug Screen, Urine    CBC auto differential    Comprehensive metabolic panel    TSH    Ethanol    Acetaminophen level    COVID/FLU A&B PCR    CBC with Differential    Diet Adult Regular PEC/CEC - Styrofoam    Vital signs while awake    Undress patient and allow them to wear facility provided apparel.    Direct Psych Observation    clonazePAM tablet 1 " mg    PEC/Psych Hold - Physicians Emergency Certificate / 72 Hour Psych Hold           Medically cleared for psychiatry placement: 5/6/2024  1:13 PM    Medically cleared for psychiatry placement: 5/6/2024  1:13 PM    Labs Reviewed   DRUG SCREEN, URINE (BEAKER) - Abnormal; Notable for the following components:       Result Value    Cannabinoids, Urine Positive (*)     All other components within normal limits    Narrative:     Cut off concentrations:    Amphetamines - 1000 ng/ml  Barbiturates - 200 ng/ml  Benzodiazepine - 200 ng/ml  Cannabinoids (THC) - 50 ng/ml  Cocaine - 300 ng/ml  Fentanyl - 1.0 ng/ml  MDMA - 500 ng/ml  Opiates - 300 ng/ml   Phencyclidine (PCP) - 25 ng/ml    Specimen submitted for drug analysis and tested for pH and specific gravity in order to evaluate sample integrity. Suspect tampering if specific gravity is <1.003 and/or pH is not within the range of 4.5 - 8.0  False negatives may result form substances such as bleach added to urine.  False positives may result for the presence of a substance with similar chemical structure to the drug or its metabolite.    This test provides only a PRELIMINARY analytical test result. A more specific alternate chemical method must be used in order to obtain a confirmed analytical result. Gas chromatography/mass spectrometry (GC/MS) is the preferred confirmatory method. Other chemical confirmation methods are available. Clinical consideration and professional judgement should be applied to any drug of abuse test result, particularly when preliminary positive results are used.    Positive results will be confirmed only at the physicians request. Unconfirmed screening results are to be used only for medical purposes (treatment).        COMPREHENSIVE METABOLIC PANEL - Abnormal; Notable for the following components:    Chloride 108 (*)     All other components within normal limits   CBC WITH DIFFERENTIAL - Abnormal; Notable for the following components:    WBC  4.17 (*)     All other components within normal limits   URINALYSIS, REFLEX TO URINE CULTURE - Normal   TSH - Normal   ALCOHOL,MEDICAL (ETHANOL) - Normal   ACETAMINOPHEN LEVEL - Normal   COVID/FLU A&B PCR - Normal    Narrative:     The Xpert Xpress SARS-CoV-2/FLU/RSV plus is a rapid, multiplexed real-time PCR test intended for the simultaneous qualitative detection and differentiation of SARS-CoV-2, Influenza A, Influenza B, and respiratory syncytial virus (RSV) viral RNA in either nasopharyngeal swab or nasal swab specimens.         CBC W/ AUTO DIFFERENTIAL    Narrative:     The following orders were created for panel order CBC auto differential.  Procedure                               Abnormality         Status                     ---------                               -----------         ------                     CBC with Differential[[id number]]       Abnormal            Final result                 Please view results for these tests on the individual orders.          No orders to display        Admission on 05/06/2024   Component Date Value Ref Range Status    Color, UA 05/06/2024 Yellow  Yellow, Light-Yellow, Dark Yellow, [place], Straw Final    Appearance, UA 05/06/2024 Clear  Clear Final    Specific Gravity, UA 05/06/2024 1.015  1.005 - 1.030 Final    pH, UA 05/06/2024 6.5  5.0 - 8.5 Final    Protein, UA 05/06/2024 Negative  Negative Final    Glucose, UA 05/06/2024 Negative  Negative, Normal Final    Ketones, UA 05/06/2024 Negative  Negative Final    Blood, UA 05/06/2024 Negative  Negative Final    Bilirubin, UA 05/06/2024 Negative  Negative Final    Urobilinogen, UA 05/06/2024 1.0  0.2, 1.0, Normal Final    Nitrites, UA 05/06/2024 Negative  Negative Final    Leukocyte Esterase, UA 05/06/2024 Negative  Negative Final    Amphetamines, Urine 05/06/2024 Negative  Negative Final    Barbituates, Urine 05/06/2024 Negative  Negative Final    Benzodiazepine, Urine 05/06/2024 Negative  Negative Final     Cannabinoids, Urine 05/06/2024 Positive (A)  Negative Final    Cocaine, Urine 05/06/2024 Negative  Negative Final    Fentanyl, Urine 05/06/2024 Negative  Negative Final    MDMA, Urine 05/06/2024 Negative  Negative Final    Opiates, Urine 05/06/2024 Negative  Negative Final    Phencyclidine, Urine 05/06/2024 Negative  Negative Final    pH, Urine 05/06/2024 6.5  3.0 - 11.0 Final    Specific Gravity, Urine Auto 05/06/2024 1.015  1.001 - 1.035 Final    Sodium Level 05/06/2024 139  136 - 145 mmol/L Final    Potassium Level 05/06/2024 3.7  3.5 - 5.1 mmol/L Final    Chloride 05/06/2024 108 (H)  98 - 107 mmol/L Final    Carbon Dioxide 05/06/2024 26  22 - 29 mmol/L Final    Glucose Level 05/06/2024 92  74 - 100 mg/dL Final    Blood Urea Nitrogen 05/06/2024 14.0  9.8 - 20.1 mg/dL Final    Creatinine 05/06/2024 0.60  0.55 - 1.02 mg/dL Final    Calcium Level Total 05/06/2024 9.2  8.4 - 10.2 mg/dL Final    Protein Total 05/06/2024 7.3  6.4 - 8.3 gm/dL Final    Albumin Level 05/06/2024 4.5  3.5 - 5.0 g/dL Final    Globulin 05/06/2024 2.8  2.4 - 3.5 gm/dL Final    Albumin/Globulin Ratio 05/06/2024 1.6  1.1 - 2.0 ratio Final    Bilirubin Total 05/06/2024 0.5  <=1.5 mg/dL Final    Alkaline Phosphatase 05/06/2024 47  40 - 150 unit/L Final    Alanine Aminotransferase 05/06/2024 16  0 - 55 unit/L Final    Aspartate Aminotransferase 05/06/2024 20  5 - 34 unit/L Final    eGFR 05/06/2024 >60  mls/min/1.73/m2 Final    TSH 05/06/2024 0.475  0.350 - 4.940 uIU/mL Final    Ethanol Level 05/06/2024 <10.0  <=10.0 mg/dL Final    Acetaminophen Level 05/06/2024 <17.4  10.0 - 30.0 ug/ml Final    Influenza A PCR 05/06/2024 Not Detected  Not Detected Final    Influenza B PCR 05/06/2024 Not Detected  Not Detected Final    SARS-CoV-2 PCR 05/06/2024 Not Detected  Not Detected, Negative Final    WBC 05/06/2024 4.17 (L)  4.50 - 11.50 x10(3)/mcL Final    RBC 05/06/2024 4.48  4.20 - 5.40 x10(6)/mcL Final    Hgb 05/06/2024 13.6  12.0 - 16.0 g/dL Final    Hct  05/06/2024 41.1  37.0 - 47.0 % Final    MCV 05/06/2024 91.7  80.0 - 94.0 fL Final    MCH 05/06/2024 30.4  27.0 - 31.0 pg Final    MCHC 05/06/2024 33.1  33.0 - 36.0 g/dL Final    RDW 05/06/2024 12.1  11.5 - 17.0 % Final    Platelet 05/06/2024 230  130 - 400 x10(3)/mcL Final    MPV 05/06/2024 9.2  7.4 - 10.4 fL Final    Neut % 05/06/2024 55.7  % Final    Lymph % 05/06/2024 33.3  % Final    Mono % 05/06/2024 7.4  % Final    Eos % 05/06/2024 2.4  % Final    Basophil % 05/06/2024 0.7  % Final    Lymph # 05/06/2024 1.39  0.6 - 4.6 x10(3)/mcL Final    Neut # 05/06/2024 2.32  2.1 - 9.2 x10(3)/mcL Final    Mono # 05/06/2024 0.31  0.1 - 1.3 x10(3)/mcL Final    Eos # 05/06/2024 0.10  0 - 0.9 x10(3)/mcL Final    Baso # 05/06/2024 0.03  <=0.2 x10(3)/mcL Final    IG# 05/06/2024 0.02  0 - 0.04 x10(3)/mcL Final    IG% 05/06/2024 0.5  % Final       Imaging Results    None                                              Diagnostic Impression:    1. Medical clearance for psychiatric admission    2. Suicidal ideation         ED Disposition Condition    Transfer to Psych Facility Stable                 ED Prescriptions    None       Follow-up Information    None          Nathaniel Miller,   05/06/24 3200

## 2024-05-06 NOTE — PLAN OF CARE
Problem: Adult Behavioral Health Plan of Care  Goal: Plan of Care Review  5/6/2024 1846 by Tere Strauss RN  Outcome: Progressing  5/6/2024 1845 by Tere Strauss RN  Outcome: Progressing  5/6/2024 1844 by Tere Strauss RN  Outcome: Progressing  Goal: Patient-Specific Goal (Individualization)  5/6/2024 1846 by Tere Strauss RN  Outcome: Progressing  5/6/2024 1845 by Tere Strauss RN  Outcome: Progressing  5/6/2024 1844 by Tere Strauss RN  Outcome: Progressing  Goal: Adheres to Safety Considerations for Self and Others  5/6/2024 1846 by Tere Strauss RN  Outcome: Progressing  5/6/2024 1845 by Tere Strauss RN  Outcome: Progressing  5/6/2024 1844 by Tere Strauss RN  Outcome: Progressing  Goal: Absence of New-Onset Illness or Injury  5/6/2024 1846 by Tere Strauss RN  Outcome: Progressing  5/6/2024 1845 by Tere Strauss RN  Outcome: Progressing  5/6/2024 1844 by Tere Strauss RN  Outcome: Progressing  Goal: Optimized Coping Skills in Response to Life Stressors  5/6/2024 1846 by Tere Strauss RN  Outcome: Progressing  5/6/2024 1845 by Tere Strauss RN  Outcome: Progressing  5/6/2024 1844 by Tere Strauss RN  Outcome: Progressing  Goal: Develops/Participates in Therapeutic [place] to Support Successful Transition  5/6/2024 1846 by Tere Strauss RN  Outcome: Progressing  5/6/2024 1845 by Tere Strauss RN  Outcome: Progressing  5/6/2024 1844 by Tere Strauss RN  Outcome: Progressing  Goal: Rounds/Family Conference  5/6/2024 1846 by Tere Strauss RN  Outcome: Progressing  5/6/2024 1845 by Tere Strauss RN  Outcome: Progressing  5/6/2024 1844 by Tere Strauss RN  Outcome: Progressing     Problem: Suicide Risk  Goal: Absence of Self-Harm  5/6/2024 1846 by Tere Strauss RN  Outcome: Progressing  5/6/2024 1845 by Tere Strauss RN  Outcome: Progressing  5/6/2024 1844 by Tere Strauss RN  Outcome: Progressing     Problem: Suicide  Risk  Goal: Absence of Self-Harm  5/6/2024 1846 by Tere Strauss RN  Outcome: Progressing  5/6/2024 1845 by Tere Strauss RN  Outcome: Progressing  5/6/2024 1844 by Tere Strauss RN  Outcome: Progressing     Problem: Depressive Signs/Symptoms  Goal: Optimized Energy Level (Depressive Signs/Symptoms)  5/6/2024 1846 by Tere Strauss RN  Outcome: Progressing  5/6/2024 1845 by Tere Strauss RN  Outcome: Progressing  5/6/2024 1844 by Tere Strauss RN  Outcome: Progressing  Goal: Optimized Cognitive Function (Depressive Signs/Symptoms)  5/6/2024 1846 by Tere Strauss RN  Outcome: Progressing  5/6/2024 1845 by Tere Strauss RN  Outcome: Progressing  5/6/2024 1844 by Tere Strauss RN  Outcome: Progressing  Goal: Increased Participation and Engagement (Depressive Signs/Symptoms)  5/6/2024 1846 by Tere Strasus RN  Outcome: Progressing  5/6/2024 1845 by Tere Strauss RN  Outcome: Progressing  5/6/2024 1844 by Tere Strauss RN  Outcome: Progressing  Goal: Enhanced Self-Esteem and Confidence (Depressive Signs/Symptoms)  5/6/2024 1846 by Tere Strauss RN  Outcome: Progressing  5/6/2024 1845 by Tere Strauss RN  Outcome: Progressing  5/6/2024 1844 by Tere Strauss RN  Outcome: Progressing  Goal: Improved Mood Symptoms (Depressive Signs/Symptoms)  5/6/2024 1846 by Tere Strauss RN  Outcome: Progressing  5/6/2024 1845 by Tere Strauss RN  Outcome: Progressing  5/6/2024 1844 by Tere Strauss RN  Outcome: Progressing  Goal: Optimized Nutrition Intake (Depressive Signs/Symptoms)  5/6/2024 1846 by Tere Strauss RN  Outcome: Progressing  5/6/2024 1845 by Strauss, Tere, RN  Outcome: Progressing  5/6/2024 1844 by Tere Strauss, RN  Outcome: Progressing

## 2024-05-07 LAB
BASOPHILS # BLD AUTO: 0.02 X10(3)/MCL
BASOPHILS NFR BLD AUTO: 0.4 %
CHOLEST SERPL-MCNC: 196 MG/DL
CHOLEST/HDLC SERPL: 4 {RATIO} (ref 0–5)
EOSINOPHIL # BLD AUTO: 0.14 X10(3)/MCL (ref 0–0.9)
EOSINOPHIL NFR BLD AUTO: 3.1 %
ERYTHROCYTE [DISTWIDTH] IN BLOOD BY AUTOMATED COUNT: 12 % (ref 11.5–17)
EST. AVERAGE GLUCOSE BLD GHB EST-MCNC: 91.1 MG/DL
HBA1C MFR BLD: 4.8 %
HCT VFR BLD AUTO: 41.7 % (ref 37–47)
HDLC SERPL-MCNC: 56 MG/DL (ref 35–60)
HGB BLD-MCNC: 13.8 G/DL (ref 12–16)
IMM GRANULOCYTES # BLD AUTO: 0.02 X10(3)/MCL (ref 0–0.04)
IMM GRANULOCYTES NFR BLD AUTO: 0.4 %
LDLC SERPL CALC-MCNC: 128 MG/DL (ref 50–140)
LYMPHOCYTES # BLD AUTO: 1.53 X10(3)/MCL (ref 0.6–4.6)
LYMPHOCYTES NFR BLD AUTO: 34.4 %
MCH RBC QN AUTO: 29.9 PG (ref 27–31)
MCHC RBC AUTO-ENTMCNC: 33.1 G/DL (ref 33–36)
MCV RBC AUTO: 90.5 FL (ref 80–94)
MONOCYTES # BLD AUTO: 0.32 X10(3)/MCL (ref 0.1–1.3)
MONOCYTES NFR BLD AUTO: 7.2 %
NEUTROPHILS # BLD AUTO: 2.42 X10(3)/MCL (ref 2.1–9.2)
NEUTROPHILS NFR BLD AUTO: 54.5 %
NRBC BLD AUTO-RTO: 0 %
PLATELET # BLD AUTO: 229 X10(3)/MCL (ref 130–400)
PMV BLD AUTO: 9.6 FL (ref 7.4–10.4)
RBC # BLD AUTO: 4.61 X10(6)/MCL (ref 4.2–5.4)
T PALLIDUM AB SER QL: NONREACTIVE
TRIGL SERPL-MCNC: 61 MG/DL (ref 37–140)
VLDLC SERPL CALC-MCNC: 12 MG/DL
WBC # SPEC AUTO: 4.45 X10(3)/MCL (ref 4.5–11.5)

## 2024-05-07 PROCEDURE — 36415 COLL VENOUS BLD VENIPUNCTURE: CPT | Performed by: PSYCHIATRY & NEUROLOGY

## 2024-05-07 PROCEDURE — 12400001 HC PSYCH SEMI-PRIVATE ROOM

## 2024-05-07 PROCEDURE — 25000003 PHARM REV CODE 250

## 2024-05-07 PROCEDURE — 25000003 PHARM REV CODE 250: Performed by: PSYCHIATRY & NEUROLOGY

## 2024-05-07 PROCEDURE — 80061 LIPID PANEL: CPT | Performed by: PSYCHIATRY & NEUROLOGY

## 2024-05-07 PROCEDURE — 85025 COMPLETE CBC W/AUTO DIFF WBC: CPT | Performed by: PSYCHIATRY & NEUROLOGY

## 2024-05-07 PROCEDURE — 83036 HEMOGLOBIN GLYCOSYLATED A1C: CPT | Performed by: PSYCHIATRY & NEUROLOGY

## 2024-05-07 PROCEDURE — 86780 TREPONEMA PALLIDUM: CPT | Performed by: PSYCHIATRY & NEUROLOGY

## 2024-05-07 RX ORDER — DESVENLAFAXINE SUCCINATE 50 MG/1
50 TABLET, EXTENDED RELEASE ORAL DAILY
Status: DISCONTINUED | OUTPATIENT
Start: 2024-05-07 | End: 2024-05-10 | Stop reason: HOSPADM

## 2024-05-07 RX ORDER — MONTELUKAST SODIUM 10 MG/1
10 TABLET ORAL NIGHTLY
Status: DISCONTINUED | OUTPATIENT
Start: 2024-05-07 | End: 2024-05-10 | Stop reason: HOSPADM

## 2024-05-07 RX ORDER — LORAZEPAM 1 MG/1
1 TABLET ORAL 3 TIMES DAILY
Status: DISCONTINUED | OUTPATIENT
Start: 2024-05-07 | End: 2024-05-08

## 2024-05-07 RX ORDER — LORAZEPAM 0.5 MG/1
0.5 TABLET ORAL 3 TIMES DAILY
Status: DISCONTINUED | OUTPATIENT
Start: 2024-05-09 | End: 2024-05-08

## 2024-05-07 RX ORDER — CETIRIZINE HYDROCHLORIDE 10 MG/1
10 TABLET ORAL NIGHTLY
Status: DISCONTINUED | OUTPATIENT
Start: 2024-05-07 | End: 2024-05-10 | Stop reason: HOSPADM

## 2024-05-07 RX ADMIN — CETIRIZINE HYDROCHLORIDE 10 MG: 10 TABLET, FILM COATED ORAL at 08:05

## 2024-05-07 RX ADMIN — ACETAMINOPHEN 650 MG: 325 TABLET, FILM COATED ORAL at 10:05

## 2024-05-07 RX ADMIN — LORAZEPAM 1 MG: 1 TABLET ORAL at 10:05

## 2024-05-07 RX ADMIN — DESVENLAFAXINE 50 MG: 50 TABLET, FILM COATED, EXTENDED RELEASE ORAL at 10:05

## 2024-05-07 RX ADMIN — TRAZODONE HYDROCHLORIDE 100 MG: 100 TABLET ORAL at 09:05

## 2024-05-07 RX ADMIN — LORAZEPAM 1 MG: 1 TABLET ORAL at 01:05

## 2024-05-07 RX ADMIN — HYDROXYZINE HYDROCHLORIDE 50 MG: 50 TABLET, FILM COATED ORAL at 09:05

## 2024-05-07 RX ADMIN — LORAZEPAM 1 MG: 1 TABLET ORAL at 08:05

## 2024-05-07 NOTE — PLAN OF CARE
Problem: Adult Behavioral Health Plan of Care  Goal: Plan of Care Review  Outcome: Progressing  Goal: Patient-Specific Goal (Individualization)  Outcome: Progressing  Goal: Adheres to Safety Considerations for Self and Others  Outcome: Progressing  Goal: Absence of New-Onset Illness or Injury  Outcome: Progressing  Goal: Optimized Coping Skills in Response to Life Stressors  Outcome: Progressing  Goal: Develops/Participates in Therapeutic [place] to Support Successful Transition  Outcome: Progressing  Goal: Rounds/Family Conference  Outcome: Progressing     Problem: Suicide Risk  Goal: Absence of Self-Harm  Outcome: Progressing     Problem: Depressive Signs/Symptoms  Goal: Optimized Energy Level (Depressive Signs/Symptoms)  Outcome: Progressing  Goal: Optimized Cognitive Function (Depressive Signs/Symptoms)  Outcome: Progressing  Goal: Increased Participation and Engagement (Depressive Signs/Symptoms)  Outcome: Progressing  Goal: Enhanced Self-Esteem and Confidence (Depressive Signs/Symptoms)  Outcome: Progressing  Goal: Improved Mood Symptoms (Depressive Signs/Symptoms)  Outcome: Progressing  Goal: Optimized Nutrition Intake (Depressive Signs/Symptoms)  Outcome: Progressing     Problem: Suicide Risk  Goal: Absence of Self-Harm  Outcome: Progressing
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
## 2024-05-07 NOTE — PROGRESS NOTES
Diana is a 55 y/o female admitted for Major Depressive Disorder, Single Episode: 6.1.2.Moderate (F32.1) and ANXIETY DISORDERS; 7.9.Generalized Anxiety Disorder (F41.1) with a uds +cannabis. CTRs met with Pt 1:1, Diana appeared anxious and depressed, was soft spoken with a little stammer, cooperative, and reported ability to perform her ADL's. CTRS educated Pt to TR group times and dates, with Diana agreeing to attend TR groups. Diana reported her treatment goal as Coping skills.         05/07/24 0811   General   Admit Date 05/06/24   Primary Diagnosis Major Depressive Disorder, Single Episode: 6.1.2.Moderate (F32.1)   Secondary Diagnosis ANXIETY DISORDERS; 7.9.Generalized Anxiety Disorder (F41.1)   Mormonism Hindu   Number of Children 3   Children Living? 3   Occupation unemployed   Does the patient have dentures? No   If you were to take part in activities, which of the following would you prefer? Both   Do you feel like you have enough to keep you busy now? Yes   Do you believe that you have the opportunity for physical activity? Yes   Activity Capabilities Moderate   Subjective   Patient states my daughter stopped talking to me   Assessment   Mobility ambulates independently   Transfers independently   Musculoskeletal pain  (c/o neck, B shoulders, and R hip pain; reports degenerative disk disease)   Visual Acuity normal vision   Visual Perception depth perception;color perception;recognizes letters;recognizes numbers   Hearing normal   Speech/Communication normal   Cognitive Concerns oriented x4   Emotional Concerns appears depressed;appears anxious;critical of self or others   Leisure Interest Survey   Leisure Interest Survey Yes   Social/Group Activities   Nondenominational/Oriental orthodox Current Interest   Volunteering Current Interest   Clubs/Organization Current Interest   Group Discuss Current Interest   Solitary Activities   Music Listening Current Interest   Physical Activities   Fitness/Exercise Programs Current  Interest   Walk/Run Current Interest   Creative Activities   Cooking Current Interest   Outdoor Activities   Gardening Current Interest   Passive Games   Classic Board Games Current Interest   Card Games Current Interest   Goals   Additional Documentation yes   Goal Formulation With patient   Time For Goal Achievement 7 days   Goal 1 coping skills   Goal 1-Progress ongoing- slowly progressing,   Plan   Planned Therapy Intervention Group Recreational Therapy   Expected Length of Stay 5-7days   PT Frequency Minimum of 3 visits per week

## 2024-05-07 NOTE — PROGRESS NOTES
05/07/24 1400   [facility] Group Therapy   Group Name Therapeutic Recreation   Specific Interventions Skilled Activity Leisure Education and Awareness   Participation Level Active;Supportive;Appropriate;Attentive;Sharing   Participation Quality Cooperative;Social   Insight/Motivation Limited;Applies New Skills   Affect/Mood Display Appropriate;Bright   Cognition Oriented;Alert   Psychomotor WNL

## 2024-05-07 NOTE — PROGRESS NOTES
05/07/24 1000   [facility] Group Therapy   Group Name Therapeutic Recreation   Specific Interventions Skilled Activity Mild Exercises   Participation Level None   Participation Quality Withdrawn;Lack of Interest   Insight/Motivation Limited   Affect/Mood Display Blunted;Flat   Cognition Oriented;Alert   Psychomotor WNL

## 2024-05-07 NOTE — H&P
[facility] - Behavioral Health Unit  History & Physical    Subjective:      Chief Complaint/Reason for Admission: major depression/ severe     Diana Sesay is a 54 y.o. female. Major depression denies suicidal ideation     History reviewed. No pertinent past medical history.  No past surgical history on file.  No family history on file.  Social History     Tobacco Use    Smoking status: Never    Smokeless tobacco: Never   Substance Use Topics    Alcohol use: Not Currently    Drug use: Yes     Types: Marijuana       PTA Medications   Medication Sig    HYDROcodone-acetaminophen (NORCO) 5-325 mg per tablet Take 1 tablet by mouth every 6 (six) hours as needed for Pain.     Review of patient's allergies indicates:   Allergen Reactions    Sudafed cold-allergy         Review of Systems   Constitutional: Negative.    HENT: Negative.     Eyes: Negative.    Respiratory: Negative.     Cardiovascular: Negative.    Gastrointestinal: Negative.    Genitourinary: Negative.    Musculoskeletal: Negative.    Skin: Negative.    Neurological: Negative.    Endo/Heme/Allergies: Negative.    Psychiatric/Behavioral:  Positive for depression. Negative for hallucinations, substance abuse and suicidal ideas.        Objective:      Vital Signs (Most Recent)  Temp: 98.2 °F (36.8 °C) (05/07/24 0701)  Pulse: 109 (05/07/24 0701)  Resp: 16 (05/06/24 2328)  BP: 116/62 (05/07/24 0701)  SpO2: 98 % (05/07/24 0701)    Vital Signs Range (Last 24H):  Temp:  [98.1 °F (36.7 °C)-98.3 °F (36.8 °C)]   Pulse:  []   Resp:  [16]   BP: (113-120)/(62-77)   SpO2:  [98 %-99 %]     Physical Exam  HENT:      Head: Normocephalic.      Right Ear: Tympanic membrane normal.      Left Ear: Tympanic membrane normal.      Nose: Nose normal.      Mouth/Throat:      Mouth: Mucous membranes are moist.   Eyes:      Extraocular Movements: Extraocular movements intact.      Pupils: Pupils are equal, round, and reactive to light.   Cardiovascular:      Rate  and Rhythm: Normal rate and regular rhythm.   Pulmonary:      Effort: Pulmonary effort is normal.   Abdominal:      General: Abdomen is flat.   Musculoskeletal:         General: Normal range of motion.   Skin:     General: Skin is warm.   Neurological:      General: No focal deficit present.      Mental Status: She is alert and oriented to person, place, and time.      Comments: Vision normal   Hearing normal   EOM intact   Face muscles normal  Facial sensation normal   Shrugs shoulders  Tongue midline            Data Review:    Recent Results (from the past 48 hour(s))   Urinalysis, Reflex to Urine Culture    Collection Time: 05/06/24 11:38 AM    Specimen: Urine   Result Value Ref Range    Color, UA Yellow Yellow, Light-Yellow, Dark Yellow, [place], Straw    Appearance, UA Clear Clear    Specific Gravity, UA 1.015 1.005 - 1.030    pH, UA 6.5 5.0 - 8.5    Protein, UA Negative Negative    Glucose, UA Negative Negative, Normal    Ketones, UA Negative Negative    Blood, UA Negative Negative    Bilirubin, UA Negative Negative    Urobilinogen, UA 1.0 0.2, 1.0, Normal    Nitrites, UA Negative Negative    Leukocyte Esterase, UA Negative Negative   Drug Screen, Urine    Collection Time: 05/06/24 11:38 AM   Result Value Ref Range    Amphetamines, Urine Negative Negative    Barbituates, Urine Negative Negative    Benzodiazepine, Urine Negative Negative    Cannabinoids, Urine Positive (A) Negative    Cocaine, Urine Negative Negative    Fentanyl, Urine Negative Negative    MDMA, Urine Negative Negative    Opiates, Urine Negative Negative    Phencyclidine, Urine Negative Negative    pH, Urine 6.5 3.0 - 11.0    Specific Gravity, Urine Auto 1.015 1.001 - 1.035   COVID/FLU A&B PCR    Collection Time: 05/06/24 12:21 PM   Result Value Ref Range    Influenza A PCR Not Detected Not Detected    Influenza B PCR Not Detected Not Detected    SARS-CoV-2 PCR Not Detected Not Detected, Negative   Comprehensive metabolic panel    Collection  Time: 05/06/24 12:23 PM   Result Value Ref Range    Sodium Level 139 136 - 145 mmol/L    Potassium Level 3.7 3.5 - 5.1 mmol/L    Chloride 108 (H) 98 - 107 mmol/L    Carbon Dioxide 26 22 - 29 mmol/L    Glucose Level 92 74 - 100 mg/dL    Blood Urea Nitrogen 14.0 9.8 - 20.1 mg/dL    Creatinine 0.60 0.55 - 1.02 mg/dL    Calcium Level Total 9.2 8.4 - 10.2 mg/dL    Protein Total 7.3 6.4 - 8.3 gm/dL    Albumin Level 4.5 3.5 - 5.0 g/dL    Globulin 2.8 2.4 - 3.5 gm/dL    Albumin/Globulin Ratio 1.6 1.1 - 2.0 ratio    Bilirubin Total 0.5 <=1.5 mg/dL    Alkaline Phosphatase 47 40 - 150 unit/L    Alanine Aminotransferase 16 0 - 55 unit/L    Aspartate Aminotransferase 20 5 - 34 unit/L    eGFR >60 mls/min/1.73/m2   TSH    Collection Time: 05/06/24 12:23 PM   Result Value Ref Range    TSH 0.475 0.350 - 4.940 uIU/mL   Ethanol    Collection Time: 05/06/24 12:23 PM   Result Value Ref Range    Ethanol Level <10.0 <=10.0 mg/dL   Acetaminophen level    Collection Time: 05/06/24 12:23 PM   Result Value Ref Range    Acetaminophen Level <17.4 10.0 - 30.0 ug/ml   CBC with Differential    Collection Time: 05/06/24 12:23 PM   Result Value Ref Range    WBC 4.17 (L) 4.50 - 11.50 x10(3)/mcL    RBC 4.48 4.20 - 5.40 x10(6)/mcL    Hgb 13.6 12.0 - 16.0 g/dL    Hct 41.1 37.0 - 47.0 %    MCV 91.7 80.0 - 94.0 fL    MCH 30.4 27.0 - 31.0 pg    MCHC 33.1 33.0 - 36.0 g/dL    RDW 12.1 11.5 - 17.0 %    Platelet 230 130 - 400 x10(3)/mcL    MPV 9.2 7.4 - 10.4 fL    Neut % 55.7 %    Lymph % 33.3 %    Mono % 7.4 %    Eos % 2.4 %    Basophil % 0.7 %    Lymph # 1.39 0.6 - 4.6 x10(3)/mcL    Neut # 2.32 2.1 - 9.2 x10(3)/mcL    Mono # 0.31 0.1 - 1.3 x10(3)/mcL    Eos # 0.10 0 - 0.9 x10(3)/mcL    Baso # 0.03 <=0.2 x10(3)/mcL    IG# 0.02 0 - 0.04 x10(3)/mcL    IG% 0.5 %   CBC with Differential    Collection Time: 05/07/24  6:56 AM   Result Value Ref Range    WBC 4.45 (L) 4.50 - 11.50 x10(3)/mcL    RBC 4.61 4.20 - 5.40 x10(6)/mcL    Hgb 13.8 12.0 - 16.0 g/dL    Hct 41.7  37.0 - 47.0 %    MCV 90.5 80.0 - 94.0 fL    MCH 29.9 27.0 - 31.0 pg    MCHC 33.1 33.0 - 36.0 g/dL    RDW 12.0 11.5 - 17.0 %    Platelet 229 130 - 400 x10(3)/mcL    MPV 9.6 7.4 - 10.4 fL    Neut % 54.5 %    Lymph % 34.4 %    Mono % 7.2 %    Eos % 3.1 %    Basophil % 0.4 %    Lymph # 1.53 0.6 - 4.6 x10(3)/mcL    Neut # 2.42 2.1 - 9.2 x10(3)/mcL    Mono # 0.32 0.1 - 1.3 x10(3)/mcL    Eos # 0.14 0 - 0.9 x10(3)/mcL    Baso # 0.02 <=0.2 x10(3)/mcL    IG# 0.02 0 - 0.04 x10(3)/mcL    IG% 0.4 %    NRBC% 0.0 %   Lipid Panel    Collection Time: 05/07/24  6:56 AM   Result Value Ref Range    Cholesterol Total 196 <=200 mg/dL    HDL Cholesterol 56 35 - 60 mg/dL    Triglyceride 61 37 - 140 mg/dL    Cholesterol/HDL Ratio 4 0 - 5    Very Low Density Lipoprotein 12     LDL Cholesterol 128.00 50.00 - 140.00 mg/dL   SYPHILIS ANTIBODY (WITH REFLEX RPR)    Collection Time: 05/07/24  6:56 AM   Result Value Ref Range    Syphilis Antibody Nonreactive Nonreactive, Equivocal   Hemoglobin A1C    Collection Time: 05/07/24  6:56 AM   Result Value Ref Range    Hemoglobin A1c 4.8 <=7.0 %    Estimated Average Glucose 91.1 mg/dL        No results found.       Assessment and Plan       Major depression

## 2024-05-07 NOTE — NURSING
"PRN Administration Note:    Behavior:    Patient (Diana Sesay is a 54 y.o. female, : 1970, MRN: [id number])     Allergies: Sudafed cold-allergy    Diana's  height is 5' 6" (1.676 m) and weight is 52.9 kg (116 lb 9.6 oz). Her temperature is 98.2 °F (36.8 °C). Her blood pressure is 116/62 and her pulse is 109. Her respiration is 16 and oxygen saturation is 98%.     Reason for PRN Administration: headache.    Intervention:    Administered Tylenol 650 mg PO PRN per physician order to Diana       Response:    Diana tolerated administration well.      Plan:     Continue to monitor per MD/PA/APRN orders; and reevaluate medication effectiveness within 30 minutes.    "

## 2024-05-07 NOTE — NURSING
"2000- Pt denies HI/SI and auditory/visual hallucinations.Pt reports anxiety. Pt states takes Klonopin TID and flexiril and is concerned she will not be able to rest. Pt given PRN for sleep and anxiety with HS meds and also given Tylenol at 2150 c/o HA. Pt reports has a hx of neck pain. 2305 pt came to nurses station stating she really needs her home medications to be able to sleep. Educated pt that she was given something for anxiety and that she would need to discuss with provider tomorrow concerns with home medications. Safety needs met. Will continue to monitor.       2100- Pt tearful and reports SI with plans to "take pills". States this plan has been something that has been thought about for "a long time now".   "

## 2024-05-08 PROCEDURE — 25000003 PHARM REV CODE 250

## 2024-05-08 PROCEDURE — 25000003 PHARM REV CODE 250: Performed by: PSYCHIATRY & NEUROLOGY

## 2024-05-08 PROCEDURE — 11400000 HC PSYCH PRIVATE ROOM

## 2024-05-08 RX ORDER — MELOXICAM 7.5 MG/1
7.5 TABLET ORAL DAILY PRN
Status: DISCONTINUED | OUTPATIENT
Start: 2024-05-08 | End: 2024-05-10 | Stop reason: HOSPADM

## 2024-05-08 RX ORDER — LORAZEPAM 1 MG/1
1 TABLET ORAL 3 TIMES DAILY
Status: DISCONTINUED | OUTPATIENT
Start: 2024-05-10 | End: 2024-05-10 | Stop reason: HOSPADM

## 2024-05-08 RX ORDER — LORAZEPAM 0.5 MG/1
0.5 TABLET ORAL 3 TIMES DAILY
Status: DISCONTINUED | OUTPATIENT
Start: 2024-05-12 | End: 2024-05-10 | Stop reason: HOSPADM

## 2024-05-08 RX ORDER — LORAZEPAM 1 MG/1
2 TABLET ORAL 3 TIMES DAILY
Status: DISPENSED | OUTPATIENT
Start: 2024-05-08 | End: 2024-05-10

## 2024-05-08 RX ADMIN — DESVENLAFAXINE 50 MG: 50 TABLET, FILM COATED, EXTENDED RELEASE ORAL at 08:05

## 2024-05-08 RX ADMIN — TRAZODONE HYDROCHLORIDE 100 MG: 100 TABLET ORAL at 08:05

## 2024-05-08 RX ADMIN — MONTELUKAST 10 MG: 10 TABLET, FILM COATED ORAL at 08:05

## 2024-05-08 RX ADMIN — CETIRIZINE HYDROCHLORIDE 10 MG: 10 TABLET, FILM COATED ORAL at 08:05

## 2024-05-08 RX ADMIN — LORAZEPAM 1 MG: 1 TABLET ORAL at 08:05

## 2024-05-08 RX ADMIN — LORAZEPAM 2 MG: 1 TABLET ORAL at 03:05

## 2024-05-08 RX ADMIN — ACETAMINOPHEN 650 MG: 325 TABLET, FILM COATED ORAL at 08:05

## 2024-05-08 RX ADMIN — LORAZEPAM 2 MG: 1 TABLET ORAL at 08:05

## 2024-05-08 NOTE — PROGRESS NOTES
Aromatherapy and Relaxation/meditation education group Co-facilitated with Carmel Padron LPN   05/08/24 1500   [facility] Group Therapy   Group Name Therapeutic Recreation   Specific Interventions Relaxation Training   Participation Level Active;Supportive;Appropriate;Attentive;Sharing   Participation Quality Cooperative;Social   Insight/Motivation Improved;Applies New Skills   Affect/Mood Display Appropriate;Bright   Cognition Oriented;Alert   Psychomotor WNL

## 2024-05-08 NOTE — PROGRESS NOTES
05/08/24 1400   [facility] Group Therapy   Group Name Therapeutic Recreation   Specific Interventions Skilled Activity Leisure Education and Awareness   Participation Level Active;Supportive;Appropriate;Attentive;Sharing   Participation Quality Cooperative   Insight/Motivation Limited;Applies New Skills   Affect/Mood Display Appropriate;Blunted;Flat   Cognition Oriented;Alert   Psychomotor WNL

## 2024-05-08 NOTE — CARE UPDATE
Treatment Team    Pt seem for treatment team today with interdisciplinary team.  Pt is Cooperative with Tx team. Pt denies symptoms at this time. MD did not change pt meds at this time. Treatment teams goals Not met at this time. Pt DC plan is home. DC date scheduled for 5.13.2024. Pt verbalized understanding of care plan and agreed to being discharged to [facility]

## 2024-05-08 NOTE — PLAN OF CARE
Problem: Adult Behavioral Health Plan of Care  Goal: Plan of Care Review  Outcome: Progressing  Flowsheets (Taken 5/8/2024 0946)  Patient Agreement with Plan of Care: agrees  Plan of Care Reviewed With: patient  Goal: Patient-Specific Goal (Individualization)  Outcome: Progressing  Flowsheets (Taken 5/8/2024 0946)  Patient Personal Strengths: independent living skills  Patient Vulnerabilities: limited support system  Goal: Adheres to Safety Considerations for Self and Others  Outcome: Progressing  Flowsheets (Taken 5/8/2024 0946)  Adheres to Safety Considerations for Self and Others: making progress toward outcome  Intervention: Develop and Maintain Individualized Safety Plan  Flowsheets (Taken 5/8/2024 0946)  Safety Measures: safety rounds completed  Goal: Absence of New-Onset Illness or Injury  Outcome: Progressing  Intervention: Identify and Manage Fall Risk  Flowsheets (Taken 5/8/2024 0946)  Safety Measures: safety rounds completed  Intervention: Prevent VTE (Venous Thromboembolism)  Flowsheets (Taken 5/8/2024 0946)  VTE Prevention/Management: ambulation promoted  Intervention: Prevent Infection  Flowsheets (Taken 5/8/2024 0946)  Infection Prevention: hand hygiene promoted  Goal: Optimized Coping Skills in Response to Life Stressors  Outcome: Progressing  Flowsheets (Taken 5/8/2024 0946)  Optimized Coping Skills in Response to Life Stressors: making progress toward outcome  Intervention: Promote Effective Coping Strategies  Flowsheets (Taken 5/8/2024 0946)  Supportive Measures: active listening utilized  Goal: Develops/Participates in Therapeutic [place] to Support Successful Transition  Outcome: Progressing  Flowsheets (Taken 5/8/2024 0946)  Develops/Participates in Therapeutic [place] to Support Successful Transition: making progress toward outcome  Intervention: Foster Therapeutic [place]  Flowsheets (Taken 5/8/2024 0946)  Trust Relationship/Rapport: care explained  Goal: Rounds/Family  Conference  Outcome: Progressing  Flowsheets (Taken 5/8/2024 0946)  Participants:   milieu/psych techs   nursing     Problem: Suicide Risk  Goal: Absence of Self-Harm  Outcome: Progressing  Intervention: Assess Risk to Self and Maintain Safety  Flowsheets (Taken 5/8/2024 0946)  Behavior Management: behavioral plan reviewed  Enhanced Safety Measures: monitored by video  Self-Harm Prevention: environmental self-harm risks assessed  Intervention: Promote Psychosocial Wellbeing  Flowsheets (Taken 5/8/2024 0946)  Sleep/Rest Enhancement: regular sleep/rest pattern promoted  Supportive Measures: active listening utilized  Family/Support System Care: self-care encouraged  Intervention: Establish Safety Plan and Continuity of Care  Flowsheets (Taken 5/8/2024 0946)  Safe Transition Promotion: resources access evaluated     Problem: Depressive Signs/Symptoms  Goal: Optimized Energy Level (Depressive Signs/Symptoms)  Outcome: Progressing  Flowsheets (Taken 5/8/2024 0946)  Mutually Determined Action Steps (Optimized Energy Level): grooms self without prompting  Intervention: Optimize Energy Level  Flowsheets (Taken 5/8/2024 0946)  Activity (Behavioral Health): up ad vega  Patient Performed Hygiene: teeth brushed  Diversional Activity: television  Goal: Optimized Cognitive Function (Depressive Signs/Symptoms)  Outcome: Progressing  Flowsheets (Taken 5/8/2024 0946)  Mutually Determined Action Steps (Optimized Cognitive Function): follows step-by-step instructions  Goal: Increased Participation and Engagement (Depressive Signs/Symptoms)  Outcome: Progressing  Flowsheets (Taken 5/8/2024 0946)  Mutually Determined Action Steps (Increased Participation and Engagement): initiates interaction with others  Intervention: Facilitate Participation and Engagement  Flowsheets (Taken 5/8/2024 0946)  Supportive Measures: active listening utilized  Diversional Activity: television  Goal: Enhanced Self-Esteem and Confidence (Depressive  Signs/Symptoms)  Outcome: Progressing  Flowsheets (Taken 5/8/2024 0946)  Mutually Determined Action Steps (Enhanced Self-Esteem and Confidence): identifies judgmental thoughts  Intervention: Promote Confidence and Self-Esteem  Flowsheets (Taken 5/8/2024 0946)  Supportive Measures: active listening utilized  Goal: Improved Mood Symptoms (Depressive Signs/Symptoms)  Outcome: Progressing  Flowsheets (Taken 5/8/2024 0946)  Mutually Determined Action Steps (Improved Mood Symptoms): engages in physical activity  Intervention: Promote Mood Improvement  Flowsheets (Taken 5/8/2024 0946)  Supportive Measures: active listening utilized  Diversional Activity: television  Goal: Optimized Nutrition Intake (Depressive Signs/Symptoms)  Outcome: Progressing  Flowsheets (Taken 5/8/2024 0946)  Mutually Determined Action Steps (Optimized Nutrition Intake): eats at meal time  Intervention: Promote Optimal Nutrition Intake  Flowsheets (Taken 5/8/2024 0946)  Nutrition Interventions: food preferences provided  Bowel Elimination Promotion: ambulation promoted     Problem: Suicide Risk  Goal: Absence of Self-Harm  Outcome: Progressing  Intervention: Assess Risk to Self and Maintain Safety  Flowsheets (Taken 5/8/2024 0946)  Behavior Management: behavioral plan reviewed  Enhanced Safety Measures: monitored by video  Self-Harm Prevention: environmental self-harm risks assessed  Intervention: Promote Psychosocial Wellbeing  Flowsheets (Taken 5/8/2024 0946)  Sleep/Rest Enhancement: regular sleep/rest pattern promoted  Supportive Measures: active listening utilized  Family/Support System Care: self-care encouraged  Intervention: Establish Safety Plan and Continuity of Care  Flowsheets (Taken 5/8/2024 0946)  Safe Transition Promotion: resources access evaluated    She is AAO X 4. Flat affect and blunted mood. Anxious. Isolated and withdrawn, but interacts with selected patients and staff. Denies SI, HI, hallucinations, and delusions. Depressed due  to her daughter not wanting to have a relationship with her. Fair eye contact noted. Speech normal tone and speed. Continue plan of care and provide a safe and therapeutic environment. Continue to monitor every fifteen minutes for safety.

## 2024-05-08 NOTE — NURSING
Treatment Team Note:      Behavior:    Patient (Diana Sesay is a 54 y.o. female, : 1970, MRN: [id number]) demonstrating an affect that was congruent. Diana demonstrating mood that is pleasant and appropriate. Diana had an appearance that was clean. Diana denies suicidal ideation. Diana denies suicide plan. Diana denies hallucinations.      Intervention:    Encourage Diana to perform self-hygiene, grooming, and changing of clothing. Encourage Diana to attend all scheduled groups. Monitor Diana's behavior and program compliance. Monitor Diana for suicidal ideation, homicidal ideation, sleep disturbance, and hallucinations. Encourage Diana to eat all portions of meals and assess for meal preferences. Monitor Diana for intake and output to ensure hydration. Notify the Physician/Physician Assistant/Advance Practice Registered Nurse (MD/PA/APRN) for any medication refusal and any change in patient condition.    Discussed with Diana course of treatment. Discussed with Diana medications ordered and schedule of medications. Discussed collateral contact with Diana.      Response:    Diana's response to treatment team meeting: cooperative. States she is feeling better today and she slept much better last night. Night Nurse reported that she slept 9 hours and 30 min last night.      Plan:     Continue to monitor per MD/PA/APRN orders; maintain patient safety.

## 2024-05-08 NOTE — PLAN OF CARE
"Diana attends TR groups, is pleasant and cooperative but timid, quite and observant, interacts well with peers and staff, attends her ADL's, and slowly progressing towards her treatment goals.    Diana attended treatment team, was cooperative, reporting "I'm doing better" and improvement on her new medication, with improved insight, and slowly progressing towards her treatment goals.  "

## 2024-05-08 NOTE — PLAN OF CARE
Problem: Adult Behavioral Health Plan of Care  Goal: Plan of Care Review  Outcome: Not Progressing  Flowsheets (Taken 5/8/2024 0154)  Patient Agreement with Plan of Care: agrees  Plan of Care Reviewed With: patient  Goal: Patient-Specific Goal (Individualization)  Outcome: Not Progressing  Flowsheets (Taken 5/8/2024 0154)  Patient Personal Strengths:   expressive of needs   family/social support   medication/treatment adherence   motivated for recovery   motivated for treatment   no history of violence   stable living environment  Patient Vulnerabilities:   limited support system   poor impulse control   family/relationship conflict  Goal: Adheres to Safety Considerations for Self and Others  Outcome: Not Progressing  Intervention: Develop and Maintain Individualized Safety Plan  Flowsheets (Taken 5/8/2024 0154)  Safety Measures: safety rounds completed  Goal: Absence of New-Onset Illness or Injury  Outcome: Not Progressing  Intervention: Identify and Manage Fall Risk  Flowsheets (Taken 5/8/2024 0154)  Safety Measures: safety rounds completed  Intervention: Prevent VTE (Venous Thromboembolism)  Flowsheets (Taken 5/8/2024 0154)  VTE Prevention/Management:   ambulation promoted   fluids promoted  Intervention: Prevent Infection  Flowsheets (Taken 5/8/2024 0154)  Infection Prevention: rest/sleep promoted  Goal: Optimized Coping Skills in Response to Life Stressors  Outcome: Not Progressing  Intervention: Promote Effective Coping Strategies  Flowsheets (Taken 5/8/2024 0154)  Supportive Measures:   active listening utilized   self-care encouraged  Goal: Develops/Participates in Therapeutic [place] to Support Successful Transition  Outcome: Not Progressing  Intervention: Foster Therapeutic [place]  Flowsheets (Taken 5/8/2024 0154)  Trust Relationship/Rapport:   care explained   questions encouraged   questions answered   emotional support provided  Intervention: Mutually Develop Transition Plan  Flowsheets (Taken  5/8/2024 0154)  Current Discharge Risk: psychiatric illness  Outpatient/Agency/Support Group Needs:   outpatient medication management   outpatient psychiatric care (specify)  Concerns to be Addressed:   mental health   medication  Goal: Rounds/Family Conference  Outcome: Not Progressing  Flowsheets (Taken 5/8/2024 0154)  Participants:   milieu/psych techs   nursing     Problem: Suicide Risk  Goal: Absence of Self-Harm  Outcome: Not Progressing  Intervention: Assess Risk to Self and Maintain Safety  Flowsheets (Taken 5/8/2024 0154)  Enhanced Safety Measures: monitored by video  Intervention: Promote Psychosocial Wellbeing  Flowsheets (Taken 5/8/2024 0154)  Supportive Measures:   active listening utilized   self-care encouraged     Problem: Suicide Risk  Goal: Absence of Self-Harm  Outcome: Not Progressing  Intervention: Assess Risk to Self and Maintain Safety  Flowsheets (Taken 5/8/2024 0154)  Enhanced Safety Measures: monitored by video  Intervention: Promote Psychosocial Wellbeing  Flowsheets (Taken 5/8/2024 0154)  Supportive Measures:   active listening utilized   self-care encouraged     Problem: Depressive Signs/Symptoms  Goal: Optimized Energy Level (Depressive Signs/Symptoms)  Outcome: Not Progressing  Intervention: Optimize Energy Level  Flowsheets (Taken 5/8/2024 0154)  Activity (Behavioral Health): up ad vega  Goal: Optimized Cognitive Function (Depressive Signs/Symptoms)  Outcome: Not Progressing  Goal: Increased Participation and Engagement (Depressive Signs/Symptoms)  Outcome: Not Progressing  Flowsheets (Taken 5/8/2024 0154)  Mutually Determined Action Steps (Increased Participation and Engagement): initiates interaction with others  Intervention: Facilitate Participation and Engagement  Flowsheets (Taken 5/8/2024 0154)  Supportive Measures:   active listening utilized   self-care encouraged  Goal: Enhanced Self-Esteem and Confidence (Depressive Signs/Symptoms)  Outcome: Not Progressing  Intervention:  Promote Confidence and Self-Esteem  Flowsheets (Taken 5/8/2024 0154)  Supportive Measures:   active listening utilized   self-care encouraged  Goal: Improved Mood Symptoms (Depressive Signs/Symptoms)  Outcome: Not Progressing  Intervention: Promote Mood Improvement  Flowsheets (Taken 5/8/2024 0154)  Supportive Measures:   active listening utilized   self-care encouraged  Goal: Optimized Nutrition Intake (Depressive Signs/Symptoms)  Outcome: Not Progressing  Intervention: Promote Optimal Nutrition Intake  Flowsheets (Taken 5/8/2024 0154)  Nutrition Interventions: food preferences provided  Bowel Elimination Promotion:   ambulation promoted   adequate fluid intake promoted   AAOx4. Flat affect. Anxious mood. Somatic, frequently asking for medications. Withdrawn and isolative. Interacts with staff when conversation is initiated. Compliant with meds. Denies suicidal ideations and hallucinations. Eye contact fair. Reports that she has a poor appetite and poor sleep pattern. No agitation or aggression noted. Disheveled appearance. Continue with plan of care and q 15 minute safety checks.   2117- Trazodone 100 mg given po for sleep.              Atarax 50 mg po given for anxiety.   2147- Resting quietly with eyes closed.

## 2024-05-08 NOTE — PROGRESS NOTES
"5/8/2024  Diana Sesay   1970   [id number]        Psychiatry Progress Note     Chief Complaint: "Better this morning"    SUBJECTIVE:   Diana Sesay is a 54 y.o. female  placed under a PEC at [facility] after presenting with increased suicidal ideation.     Precipitating stressor is that her daughter does not want a relationship with her.  There was some concern that she may have been overtaking her medication.  Will change Ativan taper (increased dose of a taper).    Started on Pristiq and feels that this is working well for her.  Slept well last night (poor sleep the previous night).  Suicidal ideation improving.  No overt behavioral issues reported by staff.     UDS: (+)cannabis  Blood alcohol: <10    Current Medications:   Scheduled Meds:    cetirizine  10 mg Oral QHS    desvenlafaxine succinate  50 mg Oral Daily    [START ON 5/9/2024] LORazepam  0.5 mg Oral TID    LORazepam  1 mg Oral TID    montelukast  10 mg Oral QHS      PRN Meds:   Current Facility-Administered Medications:     acetaminophen, 650 mg, Oral, Q6H PRN    aluminum-magnesium hydroxide-simethicone, 30 mL, Oral, Q6H PRN    haloperidoL, 10 mg, Oral, Q4H PRN **AND** diphenhydrAMINE, 50 mg, Oral, Q4H PRN **AND** LORazepam, 2 mg, Oral, Q4H PRN **AND** haloperidol lactate, 10 mg, Intramuscular, Q4H PRN **AND** diphenhydrAMINE, 50 mg, Intramuscular, Q4H PRN **AND** lorazepam, 2 mg, Intramuscular, Q4H PRN    hydrOXYzine HCL, 50 mg, Oral, Q4H PRN    traZODone, 100 mg, Oral, Nightly PRN   Psychotherapeutics (From admission, onward)      Start     Stop Route Frequency Ordered    05/09/24 0900  LORazepam tablet 0.5 mg         05/11/24 0859 Oral 3 times daily 05/07/24 0936    05/07/24 1045  desvenlafaxine succinate 24 hr tablet 50 mg         -- Oral Daily 05/07/24 0937    05/07/24 0945  LORazepam tablet 1 mg         05/09/24 0859 Oral 3 times daily 05/07/24 0936    05/06/24 1801  haloperidoL tablet 10 mg  (Med - Acute  Behavioral Management)      " "  Placed in "And" Linked Group    -- Oral Every 4 hours PRN 05/06/24 1801    05/06/24 1801  LORazepam tablet 2 mg  (Med - Acute  Behavioral Management)        Placed in "And" Linked Group    -- Oral Every 4 hours PRN 05/06/24 1801    05/06/24 1801  haloperidol lactate injection 10 mg  (Med - Acute  Behavioral Management)        Placed in "And" Linked Group    -- IM Every 4 hours PRN 05/06/24 1801    05/06/24 1801  LORazepam injection 2 mg  (Med - Acute  Behavioral Management)        Placed in "And" Linked Group    -- IM Every 4 hours PRN 05/06/24 1801    05/06/24 1801  traZODone tablet 100 mg         -- Oral Nightly PRN 05/06/24 1801            Allergies:   Review of patient's allergies indicates:   Allergen Reactions    Sudafed cold-allergy         OBJECTIVE:   Vitals   Vitals:    05/08/24 0701   BP: 102/72   Pulse: 102   Resp: 18   Temp: 98.3 °F (36.8 °C)        Labs/Imaging/Studies:   Recent Results (from the past 36 hour(s))   CBC with Differential    Collection Time: 05/07/24  6:56 AM   Result Value Ref Range    WBC 4.45 (L) 4.50 - 11.50 x10(3)/mcL    RBC 4.61 4.20 - 5.40 x10(6)/mcL    Hgb 13.8 12.0 - 16.0 g/dL    Hct 41.7 37.0 - 47.0 %    MCV 90.5 80.0 - 94.0 fL    MCH 29.9 27.0 - 31.0 pg    MCHC 33.1 33.0 - 36.0 g/dL    RDW 12.0 11.5 - 17.0 %    Platelet 229 130 - 400 x10(3)/mcL    MPV 9.6 7.4 - 10.4 fL    Neut % 54.5 %    Lymph % 34.4 %    Mono % 7.2 %    Eos % 3.1 %    Basophil % 0.4 %    Lymph # 1.53 0.6 - 4.6 x10(3)/mcL    Neut # 2.42 2.1 - 9.2 x10(3)/mcL    Mono # 0.32 0.1 - 1.3 x10(3)/mcL    Eos # 0.14 0 - 0.9 x10(3)/mcL    Baso # 0.02 <=0.2 x10(3)/mcL    IG# 0.02 0 - 0.04 x10(3)/mcL    IG% 0.4 %    NRBC% 0.0 %   Lipid Panel    Collection Time: 05/07/24  6:56 AM   Result Value Ref Range    Cholesterol Total 196 <=200 mg/dL    HDL Cholesterol 56 35 - 60 mg/dL    Triglyceride 61 37 - 140 mg/dL    Cholesterol/HDL Ratio 4 0 - 5    Very Low Density Lipoprotein 12     LDL Cholesterol 128.00 50.00 - 140.00 " "mg/dL   SYPHILIS ANTIBODY (WITH REFLEX RPR)    Collection Time: 05/07/24  6:56 AM   Result Value Ref Range    Syphilis Antibody Nonreactive Nonreactive, Equivocal   Hemoglobin A1C    Collection Time: 05/07/24  6:56 AM   Result Value Ref Range    Hemoglobin A1c 4.8 <=7.0 %    Estimated Average Glucose 91.1 mg/dL          Medical Review Of Systems:  Constitutional: negative  Respiratory: negative  Cardiovascular: negative  Gastrointestinal: negative  Genitourinary:negative  Musculoskeletal:negative  Neurological: negative       Psychiatric Mental Status Exam:  General Appearance: appears stated age, well-developed, well-nourished  Arousal: alert  Behavior: cooperative  Movements and Motor Activity: no abnormal involuntary movements noted  Orientation: oriented to person, place, time, and situation  Speech: normal rate, normal rhythm, normal volume, normal tone  Mood: "Better today"  Affect: constricted  Thought Process: linear  Associations: intact  Thought Content and Perceptions: suicidal ideation improving, no homicidal ideation, no auditory hallucinations, no visual hallucinations, no paranoid ideation, no ideas of reference  Recent and Remote Memory: recent memory intact, remote memory intact; per interview/observation with patient  Attention and Concentration: intact, attentive to conversation; per interview/observation with patient  Fund of Knowledge: intact, aware of current events, vocabulary appropriate; based on history, vocabulary, fund of knowledge, syntax, grammar, and content  Insight: questionable; based on understanding of severity of illness and HPI  Judgment: questionable; based on patient's behavior and HPI    ASSESSMENT/PLAN:   Problems Addressed/Diagnoses:  Major Depressive Disorder, single episode, moderate (F32.1)  Generalized Anxiety Disorder (F41.1)   Cannabis use disorder (F12.20)     History reviewed. No pertinent past medical history.     Plan:  Depression, acute  -Continue " Pristiq    Anxiety, chronic with acute exacerbation  -Continue Pristiq    Cannabis use, chronic with acute exacerbation  -Group/Individual psychotherapy     Expected Disposition Plan: Home        Modesto Lau M.D.

## 2024-05-09 VITALS
TEMPERATURE: 98 F | WEIGHT: 116.63 LBS | HEART RATE: 116 BPM | HEIGHT: 66 IN | BODY MASS INDEX: 18.74 KG/M2 | RESPIRATION RATE: 20 BRPM | DIASTOLIC BLOOD PRESSURE: 60 MMHG | OXYGEN SATURATION: 95 % | SYSTOLIC BLOOD PRESSURE: 130 MMHG

## 2024-05-09 PROCEDURE — 11400000 HC PSYCH PRIVATE ROOM

## 2024-05-09 PROCEDURE — 25000003 PHARM REV CODE 250: Performed by: PSYCHIATRY & NEUROLOGY

## 2024-05-09 PROCEDURE — 25000003 PHARM REV CODE 250

## 2024-05-09 RX ADMIN — LORAZEPAM 2 MG: 1 TABLET ORAL at 08:05

## 2024-05-09 RX ADMIN — DESVENLAFAXINE 50 MG: 50 TABLET, FILM COATED, EXTENDED RELEASE ORAL at 08:05

## 2024-05-09 RX ADMIN — MONTELUKAST 10 MG: 10 TABLET, FILM COATED ORAL at 08:05

## 2024-05-09 RX ADMIN — LORAZEPAM 2 MG: 1 TABLET ORAL at 03:05

## 2024-05-09 RX ADMIN — CETIRIZINE HYDROCHLORIDE 10 MG: 10 TABLET, FILM COATED ORAL at 08:05

## 2024-05-09 RX ADMIN — TRAZODONE HYDROCHLORIDE 100 MG: 100 TABLET ORAL at 08:05

## 2024-05-09 NOTE — PLAN OF CARE
Problem: Adult Behavioral Health Plan of Care  Goal: Plan of Care Review  Outcome: Progressing  Flowsheets (Taken 5/9/2024 0824)  Patient Agreement with Plan of Care: agrees  Plan of Care Reviewed With: patient  Goal: Patient-Specific Goal (Individualization)  Outcome: Progressing  Flowsheets (Taken 5/9/2024 0824)  Patient Personal Strengths: independent living skills  Patient Vulnerabilities: limited support system  Goal: Adheres to Safety Considerations for Self and Others  Outcome: Progressing  Flowsheets (Taken 5/9/2024 0824)  Adheres to Safety Considerations for Self and Others: making progress toward outcome  Intervention: Develop and Maintain Individualized Safety Plan  Flowsheets (Taken 5/9/2024 0824)  Safety Measures: safety rounds completed  Goal: Absence of New-Onset Illness or Injury  Outcome: Progressing  Intervention: Identify and Manage Fall Risk  Flowsheets (Taken 5/9/2024 0824)  Safety Measures: safety rounds completed  Intervention: Prevent VTE (Venous Thromboembolism)  Flowsheets (Taken 5/9/2024 0824)  VTE Prevention/Management: fluids promoted  Intervention: Prevent Infection  Flowsheets (Taken 5/9/2024 0824)  Infection Prevention: hand hygiene promoted  Goal: Optimized Coping Skills in Response to Life Stressors  Outcome: Progressing  Flowsheets (Taken 5/9/2024 0824)  Optimized Coping Skills in Response to Life Stressors: making progress toward outcome  Intervention: Promote Effective Coping Strategies  Flowsheets (Taken 5/9/2024 0824)  Supportive Measures: active listening utilized  Goal: Develops/Participates in Therapeutic [place] to Support Successful Transition  Outcome: Progressing  Flowsheets (Taken 5/9/2024 0824)  Develops/Participates in Therapeutic [place] to Support Successful Transition: making progress toward outcome  Intervention: Foster Therapeutic [place]  Flowsheets (Taken 5/9/2024 0824)  Trust Relationship/Rapport: care explained  Goal: Rounds/Family Conference  Outcome:  Progressing  Flowsheets (Taken 5/9/2024 0824)  Participants:   nursing   milieu/psych techs     Problem: Suicide Risk  Goal: Absence of Self-Harm  Outcome: Progressing  Intervention: Assess Risk to Self and Maintain Safety  Flowsheets (Taken 5/9/2024 0824)  Behavior Management: behavioral plan reviewed  Enhanced Safety Measures: monitored by video  Self-Harm Prevention: environmental self-harm risks assessed  Intervention: Promote Psychosocial Wellbeing  Flowsheets (Taken 5/9/2024 0824)  Sleep/Rest Enhancement: relaxation techniques promoted  Supportive Measures: active listening utilized  Family/Support System Care: self-care encouraged  Intervention: Establish Safety Plan and Continuity of Care  Flowsheets (Taken 5/9/2024 0824)  Safe Transition Promotion: resources access evaluated     Problem: Depressive Signs/Symptoms  Goal: Optimized Energy Level (Depressive Signs/Symptoms)  Outcome: Progressing  Flowsheets (Taken 5/9/2024 0824)  Mutually Determined Action Steps (Optimized Energy Level): grooms self without prompting  Intervention: Optimize Energy Level  Flowsheets (Taken 5/9/2024 0824)  Activity (Behavioral Health): up ad vega  Patient Performed Hygiene: teeth brushed  Diversional Activity: television  Goal: Optimized Cognitive Function (Depressive Signs/Symptoms)  Outcome: Progressing  Flowsheets (Taken 5/9/2024 0824)  Mutually Determined Action Steps (Optimized Cognitive Function): follows step-by-step instructions  Goal: Increased Participation and Engagement (Depressive Signs/Symptoms)  Outcome: Progressing  Flowsheets (Taken 5/9/2024 0824)  Mutually Determined Action Steps (Increased Participation and Engagement): initiates interaction with others  Intervention: Facilitate Participation and Engagement  Flowsheets (Taken 5/9/2024 0824)  Supportive Measures: active listening utilized  Diversional Activity: television  Goal: Enhanced Self-Esteem and Confidence (Depressive Signs/Symptoms)  Outcome:  Progressing  Flowsheets (Taken 5/9/2024 0824)  Mutually Determined Action Steps (Enhanced Self-Esteem and Confidence): identifies judgmental thoughts  Intervention: Promote Confidence and Self-Esteem  Flowsheets (Taken 5/9/2024 0824)  Supportive Measures: active listening utilized  Goal: Improved Mood Symptoms (Depressive Signs/Symptoms)  Outcome: Progressing  Flowsheets (Taken 5/9/2024 0824)  Mutually Determined Action Steps (Improved Mood Symptoms): engages in physical activity  Intervention: Promote Mood Improvement  Flowsheets (Taken 5/9/2024 0824)  Supportive Measures: active listening utilized  Diversional Activity: television  Goal: Optimized Nutrition Intake (Depressive Signs/Symptoms)  Outcome: Progressing  Flowsheets (Taken 5/9/2024 0824)  Mutually Determined Action Steps (Optimized Nutrition Intake): eats at meal time  Intervention: Promote Optimal Nutrition Intake  Flowsheets (Taken 5/9/2024 0824)  Nutrition Interventions: food preferences provided  Bowel Elimination Promotion: adequate fluid intake promoted     Problem: Suicide Risk  Goal: Absence of Self-Harm  Outcome: Progressing  Intervention: Assess Risk to Self and Maintain Safety  Flowsheets (Taken 5/9/2024 0824)  Behavior Management: behavioral plan reviewed  Enhanced Safety Measures: monitored by video  Self-Harm Prevention: environmental self-harm risks assessed  Intervention: Promote Psychosocial Wellbeing  Flowsheets (Taken 5/9/2024 0824)  Sleep/Rest Enhancement: relaxation techniques promoted  Supportive Measures: active listening utilized  Family/Support System Care: self-care encouraged  Intervention: Establish Safety Plan and Continuity of Care  Flowsheets (Taken 5/9/2024 0824)  Safe Transition Promotion: resources access evaluated    She is AAO X 4. Flat affect and blunted mood. At present time, she is denying depression, anxiety, hallucinations, and delusions. Good eye contact noted. Speech normal tone and speed. Interacts with selected  patients and staff. Medication compliant. Night Nurse reported that patient slept 9 hours last night. Continue plan of care and provide a safe and relaxed environment. Continue to monitor every fifteen minutes for safety.

## 2024-05-09 NOTE — PROGRESS NOTES
05/09/24 1000   [facility] Group Therapy   Group Name Therapeutic Recreation   Specific Interventions Skilled Activity Mild Exercises   Participation Level Active;Supportive;Appropriate;Attentive;Sharing   Participation Quality Cooperative;Social   Insight/Motivation Improved   Affect/Mood Display Appropriate;Bright   Cognition Oriented;Alert   Psychomotor WNL

## 2024-05-09 NOTE — PROGRESS NOTES
05/09/24 1500   [facility] Group Therapy   Group Name Therapeutic Recreation   Specific Interventions Skilled Activity Creative Expression   Participation Level Active;Supportive;Appropriate;Attentive;Sharing   Participation Quality Cooperative;Social   Insight/Motivation Improved   Affect/Mood Display Appropriate;Bright   Cognition Oriented;Alert   Psychomotor WNL

## 2024-05-09 NOTE — CARE UPDATE
Transition of care faxed to Nick WELLER, Claudia Ogden LCSW and [facility].

## 2024-05-09 NOTE — NURSING
2004 Trazodone 100 mg po given for sleep.  2005  Acetaminophen 650 mg po given for c/o headache 6/10.     2035- Remains awake. No c/o headache. Sitting up on side of bed talking with her roommate.

## 2024-05-09 NOTE — H&P
Consult Note    Consults  SUBJECTIVE:     History of Present Illness:  Patient is a 54 y.o. female presents with right hip replacement @ 37 y/o. Onset of symptoms was then with intermittent discomfort. Comes and goes course since that time. Patient denies fever. Symptoms are aggravated by certain positions. Symptoms improve with Mobic. Pt does not take the Mobic q day, only prn when needed. Pt does not want Mobic q day even in here.    Review of patient's allergies indicates:   Allergen Reactions    Sudafed cold-allergy      History reviewed. No pertinent past medical history.  No past surgical history on file.  No family history on file.  Social History     Tobacco Use    Smoking status: Never    Smokeless tobacco: Never   Substance Use Topics    Alcohol use: Not Currently    Drug use: Yes     Types: Marijuana     Review of Systems   Constitutional: Negative.    HENT: Negative.     Respiratory: Negative.     Cardiovascular: Negative.    Gastrointestinal: Negative.    Genitourinary: Negative.    Musculoskeletal:  Positive for joint pain (Intermittent right hip pain).   Skin: Negative.    Neurological: Negative.    Endo/Heme/Allergies: Negative.    Psychiatric/Behavioral:  Positive for depression.        OBJECTIVE:     Vital Signs:  Temp:  [98.1 °F (36.7 °C)]   Pulse:  [98]   Resp:  [18]   BP: (130)/(77)   SpO2:  [98 %]     Physical Exam  Vitals reviewed. Exam conducted with a chaperone present.   HENT:      Head: Normocephalic.      Mouth/Throat:      Mouth: Mucous membranes are moist.   Cardiovascular:      Rate and Rhythm: Normal rate and regular rhythm.   Pulmonary:      Effort: Pulmonary effort is normal.      Breath sounds: Normal breath sounds.   Abdominal:      General: Abdomen is flat. Bowel sounds are normal.      Palpations: Abdomen is soft.   Musculoskeletal:      Cervical back: Normal range of motion and neck supple.      Right hip: No tenderness or crepitus. Decreased range of motion (Decrease Internal  rotation (with discomfort) and external rotation as well as flexion. Nontender to palpation).      Right lower leg: No edema.      Left lower leg: No edema.   Skin:     General: Skin is warm and dry.   Neurological:      General: No focal deficit present.      Mental Status: She is alert.       Laboratory:  Recent Results (from the past 48 hour(s))   CBC with Differential    Collection Time: 05/07/24  6:56 AM   Result Value Ref Range    WBC 4.45 (L) 4.50 - 11.50 x10(3)/mcL    RBC 4.61 4.20 - 5.40 x10(6)/mcL    Hgb 13.8 12.0 - 16.0 g/dL    Hct 41.7 37.0 - 47.0 %    MCV 90.5 80.0 - 94.0 fL    MCH 29.9 27.0 - 31.0 pg    MCHC 33.1 33.0 - 36.0 g/dL    RDW 12.0 11.5 - 17.0 %    Platelet 229 130 - 400 x10(3)/mcL    MPV 9.6 7.4 - 10.4 fL    Neut % 54.5 %    Lymph % 34.4 %    Mono % 7.2 %    Eos % 3.1 %    Basophil % 0.4 %    Lymph # 1.53 0.6 - 4.6 x10(3)/mcL    Neut # 2.42 2.1 - 9.2 x10(3)/mcL    Mono # 0.32 0.1 - 1.3 x10(3)/mcL    Eos # 0.14 0 - 0.9 x10(3)/mcL    Baso # 0.02 <=0.2 x10(3)/mcL    IG# 0.02 0 - 0.04 x10(3)/mcL    IG% 0.4 %    NRBC% 0.0 %   Lipid Panel    Collection Time: 05/07/24  6:56 AM   Result Value Ref Range    Cholesterol Total 196 <=200 mg/dL    HDL Cholesterol 56 35 - 60 mg/dL    Triglyceride 61 37 - 140 mg/dL    Cholesterol/HDL Ratio 4 0 - 5    Very Low Density Lipoprotein 12     LDL Cholesterol 128.00 50.00 - 140.00 mg/dL   SYPHILIS ANTIBODY (WITH REFLEX RPR)    Collection Time: 05/07/24  6:56 AM   Result Value Ref Range    Syphilis Antibody Nonreactive Nonreactive, Equivocal   Hemoglobin A1C    Collection Time: 05/07/24  6:56 AM   Result Value Ref Range    Hemoglobin A1c 4.8 <=7.0 %    Estimated Average Glucose 91.1 mg/dL         Diagnostic Results:      Patient Active Problem List   Diagnosis    Generalized headache    Strain of neck muscle    Sciatica of right side        ASSESSMENT/PLAN:     Right Hip Pain intermittently    Mobic 7.5 mg po q d PRN only at pt's request

## 2024-05-09 NOTE — PROGRESS NOTES
"5/9/2024  Diana Sesay   1970   [id number]        Psychiatry Progress Note     Chief Complaint: "Better this morning"    SUBJECTIVE:   Diana Sesay is a 54 y.o. female  placed under a PEC at [facility] after presenting with increased suicidal ideation.     Today patient states that she is feeling much better. Her affect is greatly improved. She states that the medication has been working well. She is tolerating this well without issue. Sleeping and eating well.  Denies suicidal ideation. Denies any manic symptoms. Will continue with current POC and plan for discharge in the next day or so.     UDS: (+)cannabis  Blood alcohol: <10    Current Medications:   Scheduled Meds:    cetirizine  10 mg Oral QHS    desvenlafaxine succinate  50 mg Oral Daily    LORazepam  2 mg Oral TID    Followed by    [START ON 5/10/2024] LORazepam  1 mg Oral TID    Followed by    [START ON 5/12/2024] LORazepam  0.5 mg Oral TID    montelukast  10 mg Oral QHS      PRN Meds:   Current Facility-Administered Medications:     acetaminophen, 650 mg, Oral, Q6H PRN    aluminum-magnesium hydroxide-simethicone, 30 mL, Oral, Q6H PRN    haloperidoL, 10 mg, Oral, Q4H PRN **AND** diphenhydrAMINE, 50 mg, Oral, Q4H PRN **AND** LORazepam, 2 mg, Oral, Q4H PRN **AND** haloperidol lactate, 10 mg, Intramuscular, Q4H PRN **AND** diphenhydrAMINE, 50 mg, Intramuscular, Q4H PRN **AND** lorazepam, 2 mg, Intramuscular, Q4H PRN    hydrOXYzine HCL, 50 mg, Oral, Q4H PRN    meloxicam, 7.5 mg, Oral, Daily PRN    traZODone, 100 mg, Oral, Nightly PRN   Psychotherapeutics (From admission, onward)      Start     Stop Route Frequency Ordered    05/12/24 0900  LORazepam tablet 0.5 mg  (Order Panel)        Placed in "Followed by" Linked Group    05/14/24 0859 Oral 3 times daily 05/08/24 0952    05/10/24 0900  LORazepam tablet 1 mg  (Order Panel)        Placed in "Followed by" Linked Group    05/12/24 0859 Oral 3 times daily 05/08/24 0952    05/08/24 1000  LORazepam tablet 2 " "mg  (Order Panel)        Placed in "Followed by" Linked Group    05/10/24 0859 Oral 3 times daily 05/08/24 0952    05/07/24 1045  desvenlafaxine succinate 24 hr tablet 50 mg         -- Oral Daily 05/07/24 0937    05/06/24 1801  haloperidoL tablet 10 mg  (Med - Acute  Behavioral Management)        Placed in "And" Linked Group    -- Oral Every 4 hours PRN 05/06/24 1801    05/06/24 1801  LORazepam tablet 2 mg  (Med - Acute  Behavioral Management)        Placed in "And" Linked Group    -- Oral Every 4 hours PRN 05/06/24 1801    05/06/24 1801  haloperidol lactate injection 10 mg  (Med - Acute  Behavioral Management)        Placed in "And" Linked Group    -- IM Every 4 hours PRN 05/06/24 1801    05/06/24 1801  LORazepam injection 2 mg  (Med - Acute  Behavioral Management)        Placed in "And" Linked Group    -- IM Every 4 hours PRN 05/06/24 1801    05/06/24 1801  traZODone tablet 100 mg         -- Oral Nightly PRN 05/06/24 1801            Allergies:   Review of patient's allergies indicates:   Allergen Reactions    Sudafed cold-allergy         OBJECTIVE:   Vitals   Vitals:    05/09/24 0701   BP: 106/72   Pulse: (!) 115   Resp: 16   Temp: 98.1 °F (36.7 °C)        Labs/Imaging/Studies:   No results found for this or any previous visit (from the past 36 hour(s)).         Medical Review Of Systems:  Constitutional: negative  Respiratory: negative  Cardiovascular: negative  Gastrointestinal: negative  Genitourinary:negative  Musculoskeletal:negative  Neurological: negative       Psychiatric Mental Status Exam:  General Appearance: appears stated age, well-developed, well-nourished  Arousal: alert  Behavior: cooperative  Movements and Motor Activity: no abnormal involuntary movements noted  Orientation: oriented to person, place, time, and situation  Speech: normal rate, normal rhythm, normal volume, normal tone  Mood: "Better today"  Affect: constricted  Thought Process: linear  Associations: intact  Thought Content and " Perceptions: suicidal ideation improving, no homicidal ideation, no auditory hallucinations, no visual hallucinations, no paranoid ideation, no ideas of reference  Recent and Remote Memory: recent memory intact, remote memory intact; per interview/observation with patient  Attention and Concentration: intact, attentive to conversation; per interview/observation with patient  Fund of Knowledge: intact, aware of current events, vocabulary appropriate; based on history, vocabulary, fund of knowledge, syntax, grammar, and content  Insight: questionable; based on understanding of severity of illness and HPI  Judgment: questionable; based on patient's behavior and HPI    ASSESSMENT/PLAN:   Problems Addressed/Diagnoses:  Major Depressive Disorder, Single Episode: 6.1.2.Moderate (F32.1)   Generalized Anxiety Disorder (F41.1)     History reviewed. No pertinent past medical history.     Plan:  Mood  -Pristiq 50 mg PO QD    Expected Disposition Plan: Home        Sekou Flores, SARITAP-BC

## 2024-05-09 NOTE — PLAN OF CARE
Problem: Adult Behavioral Health Plan of Care  Goal: Plan of Care Review  Outcome: Progressing  Flowsheets (Taken 5/8/2024 2327)  Patient Agreement with Plan of Care: agrees  Plan of Care Reviewed With: patient  Goal: Patient-Specific Goal (Individualization)  Outcome: Progressing  Flowsheets (Taken 5/8/2024 2327)  Patient Personal Strengths: independent living skills  Patient Vulnerabilities:   limited support system   substance abuse/addiction  Goal: Adheres to Safety Considerations for Self and Others  Outcome: Progressing  Intervention: Develop and Maintain Individualized Safety Plan  Flowsheets (Taken 5/8/2024 2327)  Safety Measures: safety rounds completed  Goal: Absence of New-Onset Illness or Injury  Outcome: Progressing  Intervention: Identify and Manage Fall Risk  Flowsheets (Taken 5/8/2024 2327)  Safety Measures: safety rounds completed  Intervention: Prevent VTE (Venous Thromboembolism)  Flowsheets (Taken 5/8/2024 2327)  VTE Prevention/Management:   fluids promoted   ambulation promoted  Intervention: Prevent Infection  Flowsheets (Taken 5/8/2024 2327)  Infection Prevention:   hand hygiene promoted   rest/sleep promoted  Goal: Optimized Coping Skills in Response to Life Stressors  Outcome: Progressing  Flowsheets (Taken 5/8/2024 2327)  Optimized Coping Skills in Response to Life Stressors: making progress toward outcome  Intervention: Promote Effective Coping Strategies  Flowsheets (Taken 5/8/2024 2327)  Supportive Measures:   active listening utilized   self-care encouraged  Goal: Develops/Participates in Therapeutic [place] to Support Successful Transition  Outcome: Progressing  Flowsheets (Taken 5/8/2024 2327)  Develops/Participates in Therapeutic [place] to Support Successful Transition: making progress toward outcome  Intervention: Foster Therapeutic [place]  Flowsheets (Taken 5/8/2024 2327)  Trust Relationship/Rapport:   care explained   reassurance provided   emotional support provided    questions encouraged  Intervention: Mutually Develop Transition Plan  Flowsheets (Taken 5/8/2024 2327)  Current Discharge Risk: psychiatric illness  Readmission Within the Last 30 Days: no previous admission in last 30 days  Outpatient/Agency/Support Group Needs:   outpatient medication management   outpatient psychiatric care (specify)  Anticipated Discharge Disposition: home or self-care  Patient/Family Anticipated Services at Transition: mental health services  Patient/Family Anticipates Transition to: home  Concerns to be Addressed:   mental health   medication  Goal: Rounds/Family Conference  Outcome: Progressing  Flowsheets (Taken 5/8/2024 2327)  Participants:   milieu/psych techs   nursing     Problem: Suicide Risk  Goal: Absence of Self-Harm  Outcome: Progressing  Intervention: Assess Risk to Self and Maintain Safety  Flowsheets (Taken 5/8/2024 2327)  Enhanced Safety Measures: monitored by video  Intervention: Promote Psychosocial Wellbeing  Flowsheets (Taken 5/8/2024 2327)  Sleep/Rest Enhancement:   regular sleep/rest pattern promoted   awakenings minimized  Supportive Measures:   active listening utilized   self-care encouraged     Problem: Suicide Risk  Goal: Absence of Self-Harm  Outcome: Progressing  Intervention: Assess Risk to Self and Maintain Safety  Flowsheets (Taken 5/8/2024 2327)  Enhanced Safety Measures: monitored by video  Intervention: Promote Psychosocial Wellbeing  Flowsheets (Taken 5/8/2024 2327)  Sleep/Rest Enhancement:   regular sleep/rest pattern promoted   awakenings minimized  Supportive Measures:   active listening utilized   self-care encouraged     Problem: Depressive Signs/Symptoms  Goal: Optimized Energy Level (Depressive Signs/Symptoms)  Outcome: Progressing  Intervention: Optimize Energy Level  Flowsheets (Taken 5/8/2024 2327)  Activity (Behavioral Health): up ad vega  Goal: Optimized Cognitive Function (Depressive Signs/Symptoms)  Outcome: Progressing  Goal: Increased  "Participation and Engagement (Depressive Signs/Symptoms)  Outcome: Progressing  Flowsheets (Taken 5/8/2024 2327)  Mutually Determined Action Steps (Increased Participation and Engagement): initiates interaction with others  Intervention: Facilitate Participation and Engagement  Flowsheets (Taken 5/8/2024 2327)  Supportive Measures:   active listening utilized   self-care encouraged  Goal: Enhanced Self-Esteem and Confidence (Depressive Signs/Symptoms)  Outcome: Progressing  Intervention: Promote Confidence and Self-Esteem  Flowsheets (Taken 5/8/2024 2327)  Supportive Measures:   active listening utilized   self-care encouraged  Goal: Improved Mood Symptoms (Depressive Signs/Symptoms)  Outcome: Progressing  Intervention: Promote Mood Improvement  Flowsheets (Taken 5/8/2024 2327)  Supportive Measures:   active listening utilized   self-care encouraged  Goal: Optimized Nutrition Intake (Depressive Signs/Symptoms)  Outcome: Progressing  Flowsheets (Taken 5/8/2024 2327)  Mutually Determined Action Steps (Optimized Nutrition Intake): eats at meal time  Intervention: Promote Optimal Nutrition Intake  Flowsheets (Taken 5/8/2024 2327)  Nutrition Interventions: food preferences provided  Bowel Elimination Promotion:   adequate fluid intake promoted   ambulation promoted   AAOx4. Flat affect. Anxious and depressed mood. Pt endorses being sad because she wants to be home to see her grandchildren this weekend. Emotional support provided. Denies suicidal ideations or hallucinations. Remains paranoid at times. Compliant with medication.  C/o headache 6/10. Tylenol 650 mg po given.  Reports that she slept well last night. Reports a fair appetite." I don't eat very much." No agitation or aggression noted. Interacting well with staff and peers. Continue with plan of care and q 15 minute safety checks.   "

## 2024-05-10 PROCEDURE — 25000003 PHARM REV CODE 250: Performed by: PSYCHIATRY & NEUROLOGY

## 2024-05-10 PROCEDURE — 25000003 PHARM REV CODE 250

## 2024-05-10 RX ORDER — CETIRIZINE HYDROCHLORIDE 10 MG/1
10 TABLET ORAL NIGHTLY
Qty: 30 TABLET | Refills: 0 | Status: SHIPPED | OUTPATIENT
Start: 2024-05-10 | End: 2024-06-09

## 2024-05-10 RX ORDER — DESVENLAFAXINE SUCCINATE 50 MG/1
50 TABLET, EXTENDED RELEASE ORAL DAILY
Qty: 30 TABLET | Refills: 0 | Status: SHIPPED | OUTPATIENT
Start: 2024-05-10 | End: 2024-06-09

## 2024-05-10 RX ADMIN — DESVENLAFAXINE 50 MG: 50 TABLET, FILM COATED, EXTENDED RELEASE ORAL at 08:05

## 2024-05-10 RX ADMIN — LORAZEPAM 1 MG: 1 TABLET ORAL at 08:05

## 2024-05-10 NOTE — NURSING
Discharge Note:    Diana Sesay is a 54 y.o. female, : 1970, MRN: [id number], admitted on 2024 for Modesto Lau MD with a diagnosis of Major depression [F32.9].    Patient discharged on 5/10/2024 per physician orders in stable condition. Patient denied suicidal ideation, homicidal ideation, or hallucinations. Patient was discharged with valuables, personal belongings, prescriptions, discharge instructions, and an educational handout explaining the diagnosis and prescribed medications. Patient verbalized understanding of the discharge instructions and importance of follow-up visits. Patient was escorted out of the facility by [facility] and placed into a private vehicle to be transported to home.     Patient discharged on the following medications:     Medication List        START taking these medications      cetirizine 10 MG tablet  Commonly known as: ZYRTEC  Take 1 tablet (10 mg total) by mouth every evening.     desvenlafaxine succinate 50 MG Tb24  Commonly known as: PRISTIQ  Take 1 tablet (50 mg total) by mouth once daily.            STOP taking these medications      HYDROcodone-acetaminophen 5-325 mg per tablet  Commonly known as: NORCO               Where to Get Your Medications        You can get these medications from any pharmacy    Bring a paper prescription for each of these medications  cetirizine 10 MG tablet  desvenlafaxine succinate 50 MG Tb24

## 2024-05-10 NOTE — PLAN OF CARE
Problem: Adult Behavioral Health Plan of Care  Goal: Plan of Care Review  Outcome: Progressing  Flowsheets (Taken 5/9/2024 2104)  Patient Agreement with Plan of Care: agrees  Plan of Care Reviewed With: patient  Goal: Patient-Specific Goal (Individualization)  Outcome: Progressing  Flowsheets (Taken 5/9/2024 2104)  Patient Personal Strengths:   appropriate judgment/decision-making   medication/treatment adherence  Patient Vulnerabilities: limited support system  Goal: Adheres to Safety Considerations for Self and Others  Outcome: Progressing  Goal: Absence of New-Onset Illness or Injury  Outcome: Progressing  Goal: Optimized Coping Skills in Response to Life Stressors  Outcome: Progressing  Flowsheets (Taken 5/9/2024 2104)  Optimized Coping Skills in Response to Life Stressors: making progress toward outcome  Goal: Develops/Participates in Therapeutic [place] to Support Successful Transition  Outcome: Progressing  Flowsheets (Taken 5/9/2024 2104)  Develops/Participates in Therapeutic [place] to Support Successful Transition: making progress toward outcome  Goal: Rounds/Family Conference  Outcome: Progressing     Problem: Suicide Risk  Goal: Absence of Self-Harm  Outcome: Progressing     Problem: Suicide Risk  Goal: Absence of Self-Harm  Outcome: Progressing     Problem: Depressive Signs/Symptoms  Goal: Optimized Energy Level (Depressive Signs/Symptoms)  Outcome: Progressing  Flowsheets (Taken 5/9/2024 2104)  Mutually Determined Action Steps (Optimized Energy Level): dresses/ready for morning activity  Goal: Optimized Cognitive Function (Depressive Signs/Symptoms)  Outcome: Progressing  Flowsheets (Taken 5/9/2024 2104)  Mutually Determined Action Steps (Optimized Cognitive Function): remains focused during activity  Goal: Increased Participation and Engagement (Depressive Signs/Symptoms)  Outcome: Progressing  Flowsheets (Taken 5/9/2024 2104)  Mutually Determined Action Steps (Increased Participation and  Engagement): initiates interaction with others  Goal: Enhanced Self-Esteem and Confidence (Depressive Signs/Symptoms)  Outcome: Progressing  Flowsheets (Taken 5/9/2024 2104)  Mutually Determined Action Steps (Enhanced Self-Esteem and Confidence): verbalizes successes  Goal: Improved Mood Symptoms (Depressive Signs/Symptoms)  Outcome: Progressing  Goal: Optimized Nutrition Intake (Depressive Signs/Symptoms)  Outcome: Progressing   Improved althouth remains anxious with a flat affect.  Compliant with meds and cooperative with staff.

## 2024-05-10 NOTE — PLAN OF CARE
Problem: Adult Behavioral Health Plan of Care  Goal: Plan of Care Review  Outcome: Met  Goal: Patient-Specific Goal (Individualization)  Outcome: Met  Goal: Adheres to Safety Considerations for Self and Others  Outcome: Met  Goal: Absence of New-Onset Illness or Injury  Outcome: Met  Goal: Optimized Coping Skills in Response to Life Stressors  Outcome: Met  Goal: Develops/Participates in Therapeutic [place] to Support Successful Transition  Outcome: Met  Goal: Rounds/Family Conference  Outcome: Met     Problem: Suicide Risk  Goal: Absence of Self-Harm  Outcome: Met     Problem: Suicide Risk  Goal: Absence of Self-Harm  Outcome: Met     Problem: Depressive Signs/Symptoms  Goal: Optimized Energy Level (Depressive Signs/Symptoms)  Outcome: Met  Goal: Optimized Cognitive Function (Depressive Signs/Symptoms)  Outcome: Met  Goal: Increased Participation and Engagement (Depressive Signs/Symptoms)  Outcome: Met  Goal: Enhanced Self-Esteem and Confidence (Depressive Signs/Symptoms)  Outcome: Met  Goal: Improved Mood Symptoms (Depressive Signs/Symptoms)  Outcome: Met  Goal: Optimized Nutrition Intake (Depressive Signs/Symptoms)  Outcome: Met

## 2024-05-10 NOTE — NURSING
"PRN Administration Note:    Behavior:    Patient (Diana Sesay is a 54 y.o. female, : 1970, MRN: [id number])     Allergies: Sudafed cold-allergy    Diana's  height is 5' 6" (1.676 m) and weight is 52.9 kg (116 lb 9.6 oz). Her oral temperature is 97.7 °F (36.5 °C). Her blood pressure is 130/60 and her pulse is 116 (abnormal). Her respiration is 20 and oxygen saturation is 95%.     Reason for PRN Administration: sleep__________.    Intervention:    Administered trazadone_______ per physician order to Diana       Response:    Diana tolerated administration well.      Plan:     Continue to monitor per MD/PA/APRN orders; and reevaluate medication effectiveness within 30 minutes.   "

## 2024-05-10 NOTE — NURSING
"PRN Medication Follow-up Note:    Behavior:    Patient (Diana Sesay is a 54 y.o. female, : 1970, MRN: [id number])     Allergies: Sudafed cold-allergy    Diana's  height is 5' 6" (1.676 m) and weight is 52.9 kg (116 lb 9.6 oz). Her oral temperature is 97.7 °F (36.5 °C). Her blood pressure is 130/60 and her pulse is 116 (abnormal). Her respiration is 20 and oxygen saturation is 95%.     Administered trazadone_______ per physician order to Diana       Intervention:    Intervention to Diana's response: none needed_________.       Response:    Diana's response: no further c/o voiced_________      Plan:     Continue to monitor per MD/PA/APRN orders; and reevaluate medication effectiveness within 30 minutes.   "

## 2024-05-10 NOTE — PROGRESS NOTES
"5/10/2024  Diana Sesay   1970   [id number]        Psychiatry Progress Note     Chief Complaint: "Better this morning"    SUBJECTIVE:   Diana Sseay is a 54 y.o. female  placed under a PEC at [facility] after presenting with increased suicidal ideation.     Today patient states that she is feeling good. She states that the medication continues to help. She is tolerating this well without issue. Sleeping and eating well.  Denies suicidal ideation. Denies any manic symptoms. Will continue with current POC and proceed with discharge today.     UDS: (+)cannabis  Blood alcohol: <10    Current Medications:   Scheduled Meds:    cetirizine  10 mg Oral QHS    desvenlafaxine succinate  50 mg Oral Daily    LORazepam  2 mg Oral TID    Followed by    LORazepam  1 mg Oral TID    Followed by    [START ON 5/12/2024] LORazepam  0.5 mg Oral TID    montelukast  10 mg Oral QHS      PRN Meds:   Current Facility-Administered Medications:     acetaminophen, 650 mg, Oral, Q6H PRN    aluminum-magnesium hydroxide-simethicone, 30 mL, Oral, Q6H PRN    haloperidoL, 10 mg, Oral, Q4H PRN **AND** diphenhydrAMINE, 50 mg, Oral, Q4H PRN **AND** LORazepam, 2 mg, Oral, Q4H PRN **AND** haloperidol lactate, 10 mg, Intramuscular, Q4H PRN **AND** diphenhydrAMINE, 50 mg, Intramuscular, Q4H PRN **AND** lorazepam, 2 mg, Intramuscular, Q4H PRN    hydrOXYzine HCL, 50 mg, Oral, Q4H PRN    meloxicam, 7.5 mg, Oral, Daily PRN    traZODone, 100 mg, Oral, Nightly PRN   Psychotherapeutics (From admission, onward)      Start     Stop Route Frequency Ordered    05/12/24 0900  LORazepam tablet 0.5 mg  (Order Panel)        Placed in "Followed by" Linked Group    05/14/24 0859 Oral 3 times daily 05/08/24 0952    05/10/24 0900  LORazepam tablet 1 mg  (Order Panel)        Placed in "Followed by" Linked Group    05/12/24 0859 Oral 3 times daily 05/08/24 0952    05/08/24 1000  LORazepam tablet 2 mg  (Order Panel)        Placed in "Followed by" Linked Group    05/10/24 " "0859 Oral 3 times daily 05/08/24 0952    05/07/24 1045  desvenlafaxine succinate 24 hr tablet 50 mg         -- Oral Daily 05/07/24 0937    05/06/24 1801  haloperidoL tablet 10 mg  (Med - Acute  Behavioral Management)        Placed in "And" Linked Group    -- Oral Every 4 hours PRN 05/06/24 1801    05/06/24 1801  LORazepam tablet 2 mg  (Med - Acute  Behavioral Management)        Placed in "And" Linked Group    -- Oral Every 4 hours PRN 05/06/24 1801    05/06/24 1801  haloperidol lactate injection 10 mg  (Med - Acute  Behavioral Management)        Placed in "And" Linked Group    -- IM Every 4 hours PRN 05/06/24 1801    05/06/24 1801  LORazepam injection 2 mg  (Med - Acute  Behavioral Management)        Placed in "And" Linked Group    -- IM Every 4 hours PRN 05/06/24 1801    05/06/24 1801  traZODone tablet 100 mg         -- Oral Nightly PRN 05/06/24 1801            Allergies:   Review of patient's allergies indicates:   Allergen Reactions    Sudafed cold-allergy         OBJECTIVE:   Vitals   Vitals:    05/09/24 1930   BP: 130/60   Pulse: (!) 116   Resp: 20   Temp: 97.7 °F (36.5 °C)        Labs/Imaging/Studies:   No results found for this or any previous visit (from the past 36 hour(s)).         Medical Review Of Systems:  Constitutional: negative  Respiratory: negative  Cardiovascular: negative  Gastrointestinal: negative  Genitourinary:negative  Musculoskeletal:negative  Neurological: negative       Psychiatric Mental Status Exam:  General Appearance: appears stated age, well-developed, well-nourished  Arousal: alert  Behavior: cooperative  Movements and Motor Activity: no abnormal involuntary movements noted  Orientation: oriented to person, place, time, and situation  Speech: normal rate, normal rhythm, normal volume, normal tone  Mood: "Good"  Affect: constricted  Thought Process: linear  Associations: intact  Thought Content and Perceptions: denies suicidal ideation, no homicidal ideation, no auditory " hallucinations, no visual hallucinations, no paranoid ideation, no ideas of reference  Recent and Remote Memory: recent memory intact, remote memory intact; per interview/observation with patient  Attention and Concentration: intact, attentive to conversation; per interview/observation with patient  Fund of Knowledge: intact, aware of current events, vocabulary appropriate; based on history, vocabulary, fund of knowledge, syntax, grammar, and content  Insight: questionable; based on understanding of severity of illness and HPI  Judgment: questionable; based on patient's behavior and HPI    ASSESSMENT/PLAN:   Problems Addressed/Diagnoses:  Major Depressive Disorder, Single Episode: 6.1.2.Moderate (F32.1)   Generalized Anxiety Disorder (F41.1)     History reviewed. No pertinent past medical history.     Plan:  Mood  -Pristiq 50 mg PO QD    Expected Disposition Plan: Home        SARITA BabcockP-BC

## 2024-05-10 NOTE — PLAN OF CARE
Diana met reported treatment goals of coping skills.  CTRS Discharge Recommendations:  Encouraged Pt. to actively utilize available community resources to increase leisure involvement to decrease signs and symptoms of illness.  Encouraged Pt. to utilize coping skills on a regular basis to reduce the risk of decomposition and re-hospitalization.

## 2024-05-10 NOTE — PROGRESS NOTES
05/10/24 1000   [facility] Group Therapy   Group Name Therapeutic Recreation   Specific Interventions Skilled Activity Mild Exercises   Participation Level Discharged Prior to Group

## 2024-05-10 NOTE — PLAN OF CARE
Problem: Adult Behavioral Health Plan of Care  Goal: Plan of Care Review  Outcome: Met  Goal: Patient-Specific Goal (Individualization)  Outcome: Met  Goal: Adheres to Safety Considerations for Self and Others  Outcome: Met  Goal: Absence of New-Onset Illness or Injury  Outcome: Met  Goal: Optimized Coping Skills in Response to Life Stressors  Outcome: Met  Goal: Develops/Participates in Therapeutic [place]raw[place] to Support Successful Transition  Outcome: Met  Goal: Rounds/Family Conference  Outcome: Met     Problem: Suicide Risk  Goal: Absence of Self-Harm  Outcome: Met     Problem: Suicide Risk  Goal: Absence of Self-Harm  Outcome: Met     Problem: Depressive Signs/Symptoms  Goal: Optimized Energy Level (Depressive Signs/Symptoms)  Outcome: Met  Goal: Optimized Cognitive Function (Depressive Signs/Symptoms)  Outcome: Met  Goal: Increased Participation and Engagement (Depressive Signs/Symptoms)  Outcome: Met  Goal: Enhanced Self-Esteem and Confidence (Depressive Signs/Symptoms)  Outcome: Met  Goal: Improved Mood Symptoms (Depressive Signs/Symptoms)  Outcome: Met  Goal: Optimized Nutrition Intake (Depressive Signs/Symptoms)  Outcome: Met   Patient is discharged this morning

## 2024-05-13 PROBLEM — F12.20 CANNABIS DEPENDENCE, CONTINUOUS: Status: ACTIVE | Noted: 2024-05-13

## 2024-05-13 PROBLEM — F32.1 MAJOR DEPRESSIVE DISORDER, SINGLE EPISODE, MODERATE DEGREE: Status: ACTIVE | Noted: 2024-05-13

## 2024-05-13 PROBLEM — F41.1 GENERALIZED ANXIETY DISORDER: Status: ACTIVE | Noted: 2024-05-13

## 2024-05-13 NOTE — DISCHARGE SUMMARY
DISCHARGE SUMMARY  PSYCHIATRY      Admit Date: 5/6/2024  6:00 PM    Discharge Date:  5/10/2024    SITE:   [facility]    Discharge Attending Physician: Modesto Lau M.D.    Chief Complaint:  Suicidal ideation    History of Present Illness On Admit:   Diana Sesay is a 54 y.o. female placed under a PEC at [facility] after presenting with increased suicidal ideation. Patient states that her daughter stopped letting her be around her grandchildren and she became depressed over this. She states that she does not know why her daughter wont let her keep the children. She denies any issues with their relationship. Patient states that she has been treated by her PCP for anxiety for several years. Patient states that she takes Klonopin only for this and uses this TID. I educated the patient on the long term issues with this medication and how this is not an appropriate treatment for long term use. We discussed starting a medication for this purpose and beginning to taper her off the Klonopin. Patient has also never spoken with a mental health provider so we will help her with this. Patient is interested in therapy as well.      Today patient does appear to be anxious and withdrawn during this exam. She was calm and cooperative. She does continue to endorse depression and anxiety but states that she could never harm herself. Staff did report last night patient made the comment that she would like to just die because she was not restarted on her medication. Will place on an antidepressant for more long term coverage of the anxiety and begin an ativan taper.      Admit Mental Status Exam:  General Appearance: appears stated age, well developed and nourished, adequately groomed and appropriately dressed, in no acute distress  Arousal: alert with clear sensorium  Behavior: normal; cooperative; reasonably friendly, pleasant, and polite; appropriate eye-contact; under good  behavioral control  Movements and Motor Activity: no abnormal involuntary movements noted; no tics, no tremors, no akathisia, no dystonia, no evidence of tardive dyskinesia; no psychomotor agitation or retardation  Orientation: intact; oriented fully to person, place, time and situation  Speech: intact; normal rate, rhythm, volume, tone and pitch; conversational, spontaneous, and coherent  Mood: Depressed and Anxious  Affect: anxious  Thought Process: intact, linear, goal-directed, organized, logical  Associations: intact, no loosening of associations  Thought Content and Perceptions: recent suicidal ideation, no homicidal ideation, no auditory or visual hallucinations, no paranoid ideation, no ideas of reference, no evidence of delusions or psychosis  Recent and Remote Memory: intact; per interview/observation with patient  Attention and Concentration: intact; per interview/observation with patient  Fund of Knowledge: intact; based on history, vocabulary, fund of knowledge, syntax, grammar, and content  Insight: questionable; based on understanding of severity of illness and HPI  Judgment: questionable; based on patient's behavior and HPI      Diagnoses:  PRINCIPAL PROBLEM:  Major depressive disorder, single episode, moderate degree      PROBLEM LIST    Major depressive disorder, single episode, moderate degree    Generalized anxiety disorder    Cannabis dependence, continuous        Hospital Course:   Patient was admitted to [facility] and started on Pristiq.    5/8/24  Precipitating stressor is that her daughter does not want a relationship with her.  There was some concern that she may have been overtaking her medication.  Will change Ativan taper (increased dose of a taper).     Started on Pristiq and feels that this is working well for her.  Slept well last night (poor sleep the previous night).  Suicidal ideation improving.  No overt behavioral issues reported by staff.      UDS: (+)cannabis  Blood alcohol:  "<10      5/9/24  Today patient states that she is feeling much better. Her affect is greatly improved. She states that the medication has been working well. She is tolerating this well without issue. Sleeping and eating well.  Denies suicidal ideation. Denies any manic symptoms. Will continue with current POC and plan for discharge in the next day or so.       5/10/24  Today patient states that she is feeling good. She states that the medication continues to help. She is tolerating this well without issue. Sleeping and eating well.  Denies suicidal ideation. Denies any manic symptoms. Will continue with current POC and proceed with discharge today.       Current Medications:   Scheduled Meds:        DISCHARGE EXAMINATION    VITALS   Vitals:    05/08/24 1915 05/09/24 0701 05/09/24 1630 05/09/24 1930   BP: 114/78 106/72 119/74 130/60   BP Location: Left arm   Left arm   Patient Position: Sitting   Sitting   Pulse: 96 (!) 115 91 (!) 116   Resp: 18 16 17 20   Temp: 98.4 °F (36.9 °C) 98.1 °F (36.7 °C) 97.7 °F (36.5 °C) 97.7 °F (36.5 °C)   TempSrc: Oral  Oral Oral   SpO2: 100% 98% 100% 95%   Weight:       Height:             Discharge Mental Status Exam:  General Appearance: appears stated age, well-developed, well-nourished  Arousal: alert  Behavior: cooperative  Movements and Motor Activity: no abnormal involuntary movements noted  Orientation: oriented to person, place, time, and situation  Speech: normal rate, normal rhythm, normal volume, normal tone  Mood: "Good"  Affect: constricted  Thought Process: linear  Associations: intact  Thought Content and Perceptions: denies suicidal ideation, no homicidal ideation, no auditory hallucinations, no visual hallucinations, no paranoid ideation, no ideas of reference  Recent and Remote Memory: recent memory intact, remote memory intact; per interview/observation with patient  Attention and Concentration: intact, attentive to conversation; per interview/observation with " patient  Fund of Knowledge: intact, aware of current events, vocabulary appropriate; based on history, vocabulary, fund of knowledge, syntax, grammar, and content  Insight: questionable; based on understanding of severity of illness and HPI  Judgment: questionable; based on patient's behavior and HPI      Discharge Condition:  Stable    Prognosis:  Fair    Justification for multiple antipsychotics:  N/a    Disposition:  discharged to home    Follow-up:     Follow-up Information       [facility] Follow up.    Why: June 3rd @ 2:45 with BRENNON Rogers  Contact information:  [address]   [place], [place]  [phone]             Claudia Ogden LCSW Follow up.    Why: VIA telehealth on 5.14.2024 @9:45AM             [facility]    Specialties: Behavioral Health, Psychiatry, Psychology  Why: Pt will utilize same day services for first time patients. Monday or Wednesday @ 1pm-2pm Tuesday or Friday @ 8am-10am   Bring insurance card, id, social security card and proof of income  Contact information:  [address] [facility] [address]  [phone]                             Medication Regimen:  No current facility-administered medications for this encounter.    Current Outpatient Medications:     cetirizine (ZYRTEC) 10 MG tablet, Take 1 tablet (10 mg total) by mouth every evening., Disp: 30 tablet, Rfl: 0    desvenlafaxine succinate (PRISTIQ) 50 MG Tb24, Take 1 tablet (50 mg total) by mouth once daily., Disp: 30 tablet, Rfl: 0      Patient Instructions:   Continue medication regimen as prescribed.    Disposition plan per  - see  notes for details.    Patient instructed to call 911 or present to emergency department if any of the following complications develop status post discharge: suicidality, homicidality, or grave disability.     Total time spent discharging patient: <30 minutes      Modesto Lau M.D.

## 2024-06-20 ENCOUNTER — HOSPITAL ENCOUNTER (EMERGENCY)
Facility: HOSPITAL | Age: 54
Discharge: HOME | End: 2024-06-20
Attending: EMERGENCY MEDICINE
Payer: MEDICAID

## 2024-06-20 VITALS
BODY MASS INDEX: 18.64 KG/M2 | OXYGEN SATURATION: 100 % | RESPIRATION RATE: 14 BRPM | DIASTOLIC BLOOD PRESSURE: 75 MMHG | HEIGHT: 66 IN | HEART RATE: 52 BPM | TEMPERATURE: 99 F | WEIGHT: 116 LBS | SYSTOLIC BLOOD PRESSURE: 130 MMHG

## 2024-06-20 DIAGNOSIS — R11.2 NAUSEA AND VOMITING, UNSPECIFIED VOMITING TYPE: ICD-10-CM

## 2024-06-20 DIAGNOSIS — R10.13 EPIGASTRIC PAIN: Primary | ICD-10-CM

## 2024-06-20 LAB
ALBUMIN SERPL-MCNC: 4.9 G/DL (ref 3.5–5)
ALBUMIN/GLOB SERPL: 1.7 RATIO (ref 1.1–2)
ALP SERPL-CCNC: 47 UNIT/L (ref 40–150)
ALT SERPL-CCNC: 14 UNIT/L (ref 0–55)
ANION GAP SERPL CALC-SCNC: 12 MEQ/L
AST SERPL-CCNC: 22 UNIT/L (ref 5–34)
BASOPHILS # BLD AUTO: 0.02 X10(3)/MCL
BASOPHILS NFR BLD AUTO: 0.4 %
BILIRUB SERPL-MCNC: 0.8 MG/DL
BUN SERPL-MCNC: 12 MG/DL (ref 9.8–20.1)
CALCIUM SERPL-MCNC: 9.5 MG/DL (ref 8.4–10.2)
CHLORIDE SERPL-SCNC: 109 MMOL/L (ref 98–107)
CO2 SERPL-SCNC: 23 MMOL/L (ref 22–29)
CREAT SERPL-MCNC: 0.56 MG/DL (ref 0.55–1.02)
CREAT/UREA NIT SERPL: 21
EOSINOPHIL # BLD AUTO: 0.07 X10(3)/MCL (ref 0–0.9)
EOSINOPHIL NFR BLD AUTO: 1.5 %
ERYTHROCYTE [DISTWIDTH] IN BLOOD BY AUTOMATED COUNT: 11.6 % (ref 11.5–17)
GFR SERPLBLD CREATININE-BSD FMLA CKD-EPI: >60 ML/MIN/1.73/M2
GLOBULIN SER-MCNC: 2.9 GM/DL (ref 2.4–3.5)
GLUCOSE SERPL-MCNC: 89 MG/DL (ref 74–100)
HCT VFR BLD AUTO: 40.9 % (ref 37–47)
HGB BLD-MCNC: 14 G/DL (ref 12–16)
IMM GRANULOCYTES # BLD AUTO: 0.01 X10(3)/MCL (ref 0–0.04)
IMM GRANULOCYTES NFR BLD AUTO: 0.2 %
LACTATE SERPL-SCNC: 1.2 MMOL/L (ref 0.5–2.2)
LIPASE SERPL-CCNC: 22 U/L
LYMPHOCYTES # BLD AUTO: 1.64 X10(3)/MCL (ref 0.6–4.6)
LYMPHOCYTES NFR BLD AUTO: 34.2 %
MAGNESIUM SERPL-MCNC: 1.9 MG/DL (ref 1.6–2.6)
MCH RBC QN AUTO: 29.8 PG (ref 27–31)
MCHC RBC AUTO-ENTMCNC: 34.2 G/DL (ref 33–36)
MCV RBC AUTO: 87 FL (ref 80–94)
MONOCYTES # BLD AUTO: 0.33 X10(3)/MCL (ref 0.1–1.3)
MONOCYTES NFR BLD AUTO: 6.9 %
NEUTROPHILS # BLD AUTO: 2.73 X10(3)/MCL (ref 2.1–9.2)
NEUTROPHILS NFR BLD AUTO: 56.8 %
OHS QRS DURATION: 86 MS
OHS QTC CALCULATION: 465 MS
PLATELET # BLD AUTO: 228 X10(3)/MCL (ref 130–400)
PMV BLD AUTO: 9.7 FL (ref 7.4–10.4)
POTASSIUM SERPL-SCNC: 3.3 MMOL/L (ref 3.5–5.1)
PROT SERPL-MCNC: 7.8 GM/DL (ref 6.4–8.3)
RBC # BLD AUTO: 4.7 X10(6)/MCL (ref 4.2–5.4)
SODIUM SERPL-SCNC: 144 MMOL/L (ref 136–145)
TROPONIN I SERPL-MCNC: 0.01 NG/ML (ref 0–0.04)
WBC # BLD AUTO: 4.8 X10(3)/MCL (ref 4.5–11.5)

## 2024-06-20 PROCEDURE — 25500020 PHARM REV CODE 255: Performed by: EMERGENCY MEDICINE

## 2024-06-20 PROCEDURE — 84484 ASSAY OF TROPONIN QUANT: CPT | Performed by: EMERGENCY MEDICINE

## 2024-06-20 PROCEDURE — 63600175 PHARM REV CODE 636 W HCPCS: Performed by: EMERGENCY MEDICINE

## 2024-06-20 PROCEDURE — 83735 ASSAY OF MAGNESIUM: CPT | Performed by: EMERGENCY MEDICINE

## 2024-06-20 PROCEDURE — 96375 TX/PRO/DX INJ NEW DRUG ADDON: CPT

## 2024-06-20 PROCEDURE — 80053 COMPREHEN METABOLIC PANEL: CPT | Performed by: EMERGENCY MEDICINE

## 2024-06-20 PROCEDURE — 85025 COMPLETE CBC W/AUTO DIFF WBC: CPT | Performed by: EMERGENCY MEDICINE

## 2024-06-20 PROCEDURE — 99285 EMERGENCY DEPT VISIT HI MDM: CPT | Mod: 25

## 2024-06-20 PROCEDURE — 93005 ELECTROCARDIOGRAM TRACING: CPT

## 2024-06-20 PROCEDURE — 83605 ASSAY OF LACTIC ACID: CPT | Performed by: EMERGENCY MEDICINE

## 2024-06-20 PROCEDURE — 96374 THER/PROPH/DIAG INJ IV PUSH: CPT

## 2024-06-20 PROCEDURE — 96361 HYDRATE IV INFUSION ADD-ON: CPT

## 2024-06-20 PROCEDURE — 93010 ELECTROCARDIOGRAM REPORT: CPT | Mod: ,,, | Performed by: INTERNAL MEDICINE

## 2024-06-20 PROCEDURE — 83690 ASSAY OF LIPASE: CPT | Performed by: EMERGENCY MEDICINE

## 2024-06-20 RX ORDER — DICYCLOMINE HYDROCHLORIDE 20 MG/1
20 TABLET ORAL 4 TIMES DAILY
Qty: 40 TABLET | Refills: 0 | Status: SHIPPED | OUTPATIENT
Start: 2024-06-20 | End: 2024-06-30

## 2024-06-20 RX ORDER — HYDROCODONE BITARTRATE AND ACETAMINOPHEN 5; 325 MG/1; MG/1
1 TABLET ORAL EVERY 6 HOURS PRN
Qty: 12 TABLET | Refills: 0 | Status: SHIPPED | OUTPATIENT
Start: 2024-06-20 | End: 2024-06-23

## 2024-06-20 RX ORDER — FENTANYL CITRATE 50 UG/ML
100 INJECTION, SOLUTION INTRAMUSCULAR; INTRAVENOUS
Status: COMPLETED | OUTPATIENT
Start: 2024-06-20 | End: 2024-06-20

## 2024-06-20 RX ORDER — PANTOPRAZOLE SODIUM 40 MG/1
40 TABLET, DELAYED RELEASE ORAL DAILY
Qty: 30 TABLET | Refills: 2 | Status: SHIPPED | OUTPATIENT
Start: 2024-06-20 | End: 2024-09-18

## 2024-06-20 RX ORDER — ONDANSETRON 8 MG/1
8 TABLET, ORALLY DISINTEGRATING ORAL EVERY 6 HOURS PRN
Qty: 20 TABLET | Refills: 0 | Status: SHIPPED | OUTPATIENT
Start: 2024-06-20 | End: 2024-06-25

## 2024-06-20 RX ORDER — ONDANSETRON HYDROCHLORIDE 2 MG/ML
4 INJECTION, SOLUTION INTRAVENOUS
Status: COMPLETED | OUTPATIENT
Start: 2024-06-20 | End: 2024-06-20

## 2024-06-20 RX ADMIN — IOPAMIDOL 75 ML: 755 INJECTION, SOLUTION INTRAVENOUS at 03:06

## 2024-06-20 RX ADMIN — FENTANYL CITRATE 100 MCG: 50 INJECTION, SOLUTION INTRAMUSCULAR; INTRAVENOUS at 02:06

## 2024-06-20 RX ADMIN — ONDANSETRON 4 MG: 2 INJECTION INTRAMUSCULAR; INTRAVENOUS at 02:06

## 2024-06-20 RX ADMIN — SODIUM CHLORIDE, POTASSIUM CHLORIDE, SODIUM LACTATE AND CALCIUM CHLORIDE 1000 ML: 600; 310; 30; 20 INJECTION, SOLUTION INTRAVENOUS at 02:06

## 2024-06-20 NOTE — ED PROVIDER NOTES
Encounter Date: 6/20/2024       History     Chief Complaint   Patient presents with    Abdominal Pain     Abd pain with N/V for 3 days     The history is provided by the patient.   Abdominal Pain  The current episode started several days ago. The onset of the illness was gradual. The abdominal pain is located in the epigastric region. The abdominal pain radiates to the back. The abdominal pain is relieved by nothing. The other symptoms of the illness include nausea. The other symptoms of the illness do not include fever, shortness of breath, vomiting, diarrhea or dysuria.   Nausea began today.   Symptoms associated with the illness do not include back pain.   Sudden increase in pain this afternoon while shopping.    Review of patient's allergies indicates:   Allergen Reactions    Sudafed cold-allergy      Past Medical History:   Diagnosis Date    Anxiety disorder, unspecified      History reviewed. No pertinent surgical history.  No family history on file.  Social History     Tobacco Use    Smoking status: Never    Smokeless tobacco: Never   Substance Use Topics    Alcohol use: Not Currently    Drug use: Not Currently     Types: Marijuana     Comment: past-gummies     Review of Systems   Constitutional:  Negative for fever.   HENT:  Negative for sore throat.    Respiratory:  Negative for shortness of breath.    Cardiovascular:  Negative for chest pain.   Gastrointestinal:  Positive for abdominal pain and nausea. Negative for diarrhea and vomiting.   Genitourinary:  Negative for dysuria.   Musculoskeletal:  Negative for back pain.   Skin:  Negative for rash.   Neurological:  Negative for weakness.   Hematological:  Does not bruise/bleed easily.       Physical Exam     Initial Vitals [06/20/24 1425]   BP Pulse Resp Temp SpO2   (!) 105/44 83 (!) 22 98.6 °F (37 °C) 100 %      MAP       --         Physical Exam    Nursing note and vitals reviewed.  Constitutional: She appears well-developed and well-nourished. She  appears distressed (appears uncomfortable).   HENT:   Head: Normocephalic and atraumatic.   Right Ear: External ear normal.   Left Ear: External ear normal.   Eyes: Conjunctivae and EOM are normal. Pupils are equal, round, and reactive to light.   Neck: Neck supple.   Normal range of motion.  Cardiovascular:  Normal rate, regular rhythm, normal heart sounds and intact distal pulses.           Pulmonary/Chest: Breath sounds normal.   Abdominal: Abdomen is soft. Bowel sounds are normal. There is abdominal tenderness in the epigastric area.     There is guarding. There is no rebound.   Musculoskeletal:         General: Normal range of motion.      Cervical back: Normal range of motion and neck supple.     Neurological: She is alert and oriented to person, place, and time. GCS score is 15. GCS eye subscore is 4. GCS verbal subscore is 5. GCS motor subscore is 6.   Skin: Skin is warm and dry. Capillary refill takes less than 2 seconds.   Psychiatric: She has a normal mood and affect. Her behavior is normal. Judgment and thought content normal.         ED Course   Procedures  Labs Reviewed   COMPREHENSIVE METABOLIC PANEL - Abnormal; Notable for the following components:       Result Value    Potassium 3.3 (*)     Chloride 109 (*)     All other components within normal limits   LACTIC ACID, PLASMA - Normal   LIPASE - Normal   MAGNESIUM - Normal   TROPONIN I - Normal   CBC W/ AUTO DIFFERENTIAL    Narrative:     The following orders were created for panel order CBC auto differential.  Procedure                               Abnormality         Status                     ---------                               -----------         ------                     CBC with Differential[[id number]]                           Final result                 Please view results for these tests on the individual orders.   CBC WITH DIFFERENTIAL     EKG Readings: (Independently Interpreted)   Initial Reading: No STEMI. Rhythm: Sinus  Arrhythmia. Heart Rate: 71. Ectopy: No Ectopy. Conduction: Normal. ST Segments: Normal ST Segments. T Waves: Normal. Axis: Normal. Clinical Impression: Sinus Arrhythmia       Imaging Results              CT Abdomen Pelvis With IV Contrast NO Oral Contrast (Final result)  Result time 06/20/24 16:29:02      Final result by Joshua Luz MD (06/20/24 16:29:02)                   Impression:      1. Hepatomegaly with mild periportal tracking/edema which is a nonspecific finding  2. Diverticulosis coli  3. Retroaortic left renal vein/normal variant  4. Extensive beam hardening artifact at the pelvis due to a right hip prosthetic  5. The appendix is not identified with certainty.  Evaluation is limited secondary to unopacified loops of bowel in the right lower abdomen and pelvis and beam hardening artifact due to the right hip prosthetic.  Repeat exam with oral contrast would allow further evaluation if clinically indicated.      Electronically signed by: Joshua Luz  Date:    06/20/2024  Time:    16:29               Narrative:    EXAMINATION:  CT ABDOMEN PELVIS WITH IV CONTRAST    CLINICAL HISTORY:  Epigastric pain;, .    TECHNIQUE:  PATIENT RADIATION DOSE: DLP(mGycm) : 233    As per PQRS measures, all CT scans at this facility used dose modulation, iterative reconstruction, and/or weight based dose adjustment when appropriate to reduce radiation dose to as low as reasonably achievable.    COMPARISON:  None available    FINDINGS:  Serial axial images were obtained through the abdomen and pelvis with the administration of  IV contrast. Coronal and sagittal reconstructions where also obtained. Degenerative changes and curvature of the thoracolumbar spine.  There is grade 1 anterolisthesis of L5 on S1.  The heart is normal in size.  Mild atelectasis and/or scarring is evident at the lung bases.  There is mild periportal tracking.  The liver is enlarged.  The spleen, adrenal glands, gallbladder, and pancreas are grossly  within normal limits.  The stomach is partially distended with fluid.  There is mild mucosal prominence at the GE junction.  A retro aortic left renal vein is present.  The kidneys are relatively symmetric in size.  No hydronephrosis identified.  A subcentimeter small round defect is noted to the lower left kidney suggesting a 5 mm cyst.  No dilated loops of bowel are identified.  Gas and feces are seen within the colon.  There is beam hardening artifact at the pelvis due to a right hip prosthetic.  The uterus is poorly visualized.  The bladder is nondistended.  A few scattered diverticula are noted to the sigmoid colon.  No obvious free fluid collection is seen.  The appendix is not identified with certainty.                                       Medications   lactated ringers bolus 1,000 mL (1,000 mLs Intravenous New Bag 6/20/24 1458)   fentaNYL injection 100 mcg (100 mcg Intravenous Given 6/20/24 1459)   ondansetron injection 4 mg (4 mg Intravenous Given 6/20/24 1459)   iopamidoL (ISOVUE-370) injection 100 mL (75 mLs Intravenous Given 6/20/24 1547)     Medical Decision Making  Amount and/or Complexity of Data Reviewed  Labs: ordered. Decision-making details documented in ED Course.  Radiology: ordered. Decision-making details documented in ED Course.  ECG/medicine tests: ordered and independent interpretation performed. Decision-making details documented in ED Course.    Risk  Prescription drug management.  Parenteral controlled substances.    Differential includes:  PUD, gastritis, GERD, hiatal hernia, GB disease, pancreatitis, duodenitis, angina, infectious process, perforated viscous.  Will obtain CBC, CMP. Lipase, mag, troponin, UA and CT abdomen/pelvis and give IVF, analgesic and antiemetic.  Will also obtain EKG.           ED Course as of 06/20/24 1653   Th Jun 20, 2024   1648 Discussed results with patient and her .  Hepatic finding on CT likely artifactual in the setting of normal LFT's.  Will  refer to GI for outpatient EGD, as PUD seems likely.  Will D/C with symptomatic treatment as well as PPI.  Encouraged to avoid caffeine, EtOH, peppermint, chocolate. [CL]      ED Course User Index  [CL] Modesto Peraza MD                           Clinical Impression:  Final diagnoses:  [R10.13] Epigastric pain (Primary)  [R11.2] Nausea and vomiting, unspecified vomiting type          ED Disposition Condition    Discharge Stable          ED Prescriptions       Medication Sig Dispense Start Date End Date Auth. Provider    dicyclomine (BENTYL) 20 mg tablet Take 1 tablet (20 mg total) by mouth 4 (four) times daily. for 10 days 40 tablet 6/20/2024 6/30/2024 Modesto Peraza MD    ondansetron (ZOFRAN-ODT) 8 MG TbDL Take 1 tablet (8 mg total) by mouth every 6 (six) hours as needed (nausea). 20 tablet 6/20/2024 6/25/2024 Modesto Peraza MD    pantoprazole (PROTONIX) 40 MG tablet Take 1 tablet (40 mg total) by mouth once daily. 30 tablet 6/20/2024 9/18/2024 Modesto Peraza MD    HYDROcodone-acetaminophen (NORCO) 5-325 mg per tablet Take 1 tablet by mouth every 6 (six) hours as needed (pain). Final diagnoses: [R10.13] Epigastric pain (Primary) [R11.2] Nausea and vomiting, unspecified vomiting type 12 tablet 6/20/2024 6/23/2024 Modesto Peraza MD          Follow-up Information       Follow up With Specialties Details Why Contact Info    Follow up with your primary MD in 3-5 days if not improved.  Return to ED for worsening symptoms.        You will be contacted by the GI clinic to schedule your endoscopy.                 Modesto Peraza MD  06/20/24 3527

## 2024-07-30 DIAGNOSIS — M25.551 RIGHT HIP PAIN: Primary | ICD-10-CM

## 2024-08-01 ENCOUNTER — HOSPITAL ENCOUNTER (OUTPATIENT)
Dept: RADIOLOGY | Facility: HOSPITAL | Age: 54
Discharge: HOME | End: 2024-08-01
Attending: NURSE PRACTITIONER
Payer: MEDICAID

## 2024-08-01 DIAGNOSIS — M25.551 RIGHT HIP PAIN: ICD-10-CM

## 2024-08-01 DIAGNOSIS — Z12.31 OTHER SCREENING MAMMOGRAM: ICD-10-CM

## 2024-08-01 PROCEDURE — 73502 X-RAY EXAM HIP UNI 2-3 VIEWS: CPT | Mod: TC,RT

## 2024-08-01 PROCEDURE — 77067 SCR MAMMO BI INCL CAD: CPT | Mod: TC

## 2024-08-01 PROCEDURE — 77063 BREAST TOMOSYNTHESIS BI: CPT | Mod: TC

## 2024-08-02 ENCOUNTER — TELEPHONE (OUTPATIENT)
Dept: ORTHOPEDICS | Facility: CLINIC | Age: 54
End: 2024-08-02
Payer: MEDICAID

## 2024-09-20 DIAGNOSIS — G89.29 HIP PAIN, CHRONIC, RIGHT: Primary | ICD-10-CM

## 2024-09-20 DIAGNOSIS — M25.551 HIP PAIN, CHRONIC, RIGHT: Primary | ICD-10-CM

## 2024-10-02 DIAGNOSIS — M25.551 RIGHT HIP PAIN: Primary | ICD-10-CM

## 2024-10-09 ENCOUNTER — HOSPITAL ENCOUNTER (OUTPATIENT)
Dept: RADIOLOGY | Facility: HOSPITAL | Age: 54
Discharge: HOME | End: 2024-10-09
Attending: NURSE PRACTITIONER
Payer: MEDICAID

## 2024-10-09 DIAGNOSIS — M25.551 RIGHT HIP PAIN: ICD-10-CM

## 2024-10-09 PROCEDURE — 73721 MRI JNT OF LWR EXTRE W/O DYE: CPT | Mod: TC,RT

## 2024-10-28 ENCOUNTER — LAB VISIT (OUTPATIENT)
Dept: LAB | Facility: HOSPITAL | Age: 54
End: 2024-10-28
Attending: NURSE PRACTITIONER
Payer: MEDICAID

## 2024-10-28 DIAGNOSIS — M89.50: Primary | ICD-10-CM

## 2024-10-28 LAB
BASOPHILS # BLD AUTO: 0.02 X10(3)/MCL
BASOPHILS NFR BLD AUTO: 0.4 %
CRP SERPL-MCNC: <1 MG/L
EOSINOPHIL # BLD AUTO: 0.12 X10(3)/MCL (ref 0–0.9)
EOSINOPHIL NFR BLD AUTO: 2.7 %
ERYTHROCYTE [DISTWIDTH] IN BLOOD BY AUTOMATED COUNT: 11.5 % (ref 11.5–17)
ERYTHROCYTE [SEDIMENTATION RATE] IN BLOOD: 13 MM/HR (ref 0–20)
HCT VFR BLD AUTO: 41.3 % (ref 37–47)
HGB BLD-MCNC: 13.6 G/DL (ref 12–16)
IMM GRANULOCYTES # BLD AUTO: 0.05 X10(3)/MCL (ref 0–0.04)
IMM GRANULOCYTES NFR BLD AUTO: 1.1 %
LYMPHOCYTES # BLD AUTO: 1.45 X10(3)/MCL (ref 0.6–4.6)
LYMPHOCYTES NFR BLD AUTO: 32.6 %
MCH RBC QN AUTO: 29.8 PG (ref 27–31)
MCHC RBC AUTO-ENTMCNC: 32.9 G/DL (ref 33–36)
MCV RBC AUTO: 90.6 FL (ref 80–94)
MONOCYTES # BLD AUTO: 0.34 X10(3)/MCL (ref 0.1–1.3)
MONOCYTES NFR BLD AUTO: 7.6 %
NEUTROPHILS # BLD AUTO: 2.47 X10(3)/MCL (ref 2.1–9.2)
NEUTROPHILS NFR BLD AUTO: 55.6 %
PLATELET # BLD AUTO: 219 X10(3)/MCL (ref 130–400)
PMV BLD AUTO: 9.9 FL (ref 7.4–10.4)
RBC # BLD AUTO: 4.56 X10(6)/MCL (ref 4.2–5.4)
WBC # BLD AUTO: 4.45 X10(3)/MCL (ref 4.5–11.5)

## 2024-10-28 PROCEDURE — 86140 C-REACTIVE PROTEIN: CPT

## 2024-10-28 PROCEDURE — 36415 COLL VENOUS BLD VENIPUNCTURE: CPT

## 2024-10-28 PROCEDURE — 85025 COMPLETE CBC W/AUTO DIFF WBC: CPT

## 2024-10-28 PROCEDURE — 85652 RBC SED RATE AUTOMATED: CPT

## 2025-02-11 ENCOUNTER — OFFICE VISIT (OUTPATIENT)
Dept: ORTHOPEDICS | Facility: CLINIC | Age: 55
End: 2025-02-11
Payer: MEDICAID

## 2025-02-11 DIAGNOSIS — Z96.641 CHRONIC HIP PAIN AFTER TOTAL REPLACEMENT OF RIGHT HIP JOINT: Primary | ICD-10-CM

## 2025-02-11 DIAGNOSIS — M25.551 CHRONIC HIP PAIN AFTER TOTAL REPLACEMENT OF RIGHT HIP JOINT: Primary | ICD-10-CM

## 2025-02-11 DIAGNOSIS — G89.29 CHRONIC HIP PAIN AFTER TOTAL REPLACEMENT OF RIGHT HIP JOINT: Primary | ICD-10-CM

## 2025-02-11 PROCEDURE — 1160F RVW MEDS BY RX/DR IN RCRD: CPT | Mod: CPTII,,, | Performed by: ORTHOPAEDIC SURGERY

## 2025-02-11 PROCEDURE — 99999 PR PBB SHADOW E&M-EST. PATIENT-LVL III: CPT | Mod: PBBFAC,,, | Performed by: ORTHOPAEDIC SURGERY

## 2025-02-11 PROCEDURE — 99213 OFFICE O/P EST LOW 20 MIN: CPT | Mod: PBBFAC,PN | Performed by: ORTHOPAEDIC SURGERY

## 2025-02-11 PROCEDURE — 1159F MED LIST DOCD IN RCRD: CPT | Mod: CPTII,,, | Performed by: ORTHOPAEDIC SURGERY

## 2025-02-11 PROCEDURE — 99204 OFFICE O/P NEW MOD 45 MIN: CPT | Mod: S$PBB,,, | Performed by: ORTHOPAEDIC SURGERY

## 2025-02-11 RX ORDER — CLONAZEPAM 1 MG/1
1 TABLET ORAL 3 TIMES DAILY
COMMUNITY
Start: 2024-04-23

## 2025-02-11 RX ORDER — HYDROCODONE BITARTRATE AND ACETAMINOPHEN 5; 325 MG/1; MG/1
2 TABLET ORAL 2 TIMES DAILY
COMMUNITY

## 2025-02-11 RX ORDER — LORATADINE 10 MG/1
1 TABLET ORAL DAILY
COMMUNITY
Start: 2024-07-30

## 2025-02-11 RX ORDER — CYCLOBENZAPRINE HCL 10 MG
10 TABLET ORAL 2 TIMES DAILY PRN
COMMUNITY

## 2025-02-11 RX ORDER — MONTELUKAST SODIUM 10 MG/1
10 TABLET ORAL
COMMUNITY

## 2025-02-11 RX ORDER — ATORVASTATIN CALCIUM 20 MG/1
TABLET, FILM COATED ORAL
COMMUNITY
Start: 2025-01-28

## 2025-02-11 RX ORDER — MELOXICAM 15 MG/1
15 TABLET ORAL
COMMUNITY

## 2025-02-11 RX ORDER — PANTOPRAZOLE SODIUM 40 MG/1
TABLET, DELAYED RELEASE ORAL
COMMUNITY
Start: 2025-01-28

## 2025-02-11 NOTE — PROGRESS NOTES
Subjective:      Patient ID: Diana Sesay is a 54 y.o. female.    Chief Complaint: Pain of the Right Hip and Consult (/)      HPI:  Fifteen years postop  The patient is seen for postop follow-up of right  DHAVAL.  The procedure was done after an injury in [facility].  The patient reportedly did well until about 1 year ago.  She reports increased pain and stiffness that started after a physical examination.  Pain control has been  progressively difficult  They feel that they are ambulating painfully  Postoperative complaints include:  As above      Current Outpatient Medications:     atorvastatin (LIPITOR) 20 MG tablet, Take 1 tablet every day by oral route at bedtime for 90 days., Disp: , Rfl:     clonazePAM (KLONOPIN) 1 MG tablet, Take 1 tablet by mouth 3 (three) times daily., Disp: , Rfl:     cyclobenzaprine (FLEXERIL) 10 MG tablet, Take 10 mg by mouth 2 (two) times daily as needed., Disp: , Rfl:     HYDROcodone-acetaminophen (NORCO) 5-325 mg per tablet, Take 2 tablets by mouth 2 (two) times daily., Disp: , Rfl:     loratadine (CLARITIN) 10 mg tablet, Take 1 tablet by mouth once daily., Disp: , Rfl:     meloxicam (MOBIC) 15 MG tablet, Take 15 mg by mouth., Disp: , Rfl:     montelukast (SINGULAIR) 10 mg tablet, Take 10 mg by mouth., Disp: , Rfl:     pantoprazole (PROTONIX) 40 MG tablet, Take 1 tablet every day by oral route as directed for 30 days., Disp: , Rfl:     cetirizine (ZYRTEC) 10 MG tablet, Take 1 tablet (10 mg total) by mouth every evening., Disp: 30 tablet, Rfl: 0    desvenlafaxine succinate (PRISTIQ) 50 MG Tb24, Take 1 tablet (50 mg total) by mouth once daily., Disp: 30 tablet, Rfl: 0  Review of patient's allergies indicates:   Allergen Reactions    Sudafed cold-allergy        There were no vitals taken for this visit.    Review of Systems   Constitutional: Negative for fever and weight loss.   HENT:  Negative for congestion.    Eyes:  Negative for visual disturbance.   Cardiovascular:   Negative for chest pain.   Respiratory:  Negative for shortness of breath.    Hematologic/Lymphatic: Negative for bleeding problem. Does not bruise/bleed easily.   Skin:  Negative for poor wound healing.   Musculoskeletal:  Positive for joint pain.   Gastrointestinal:  Negative for abdominal pain.   Genitourinary:  Negative for dysuria.   Neurological:  Negative for seizures.   Psychiatric/Behavioral:  Negative for altered mental status.    Allergic/Immunologic: Negative for persistent infections.           Objective:    Ortho Exam          Alert, oriented, no acute distress  Sclera-Normal  Respiratory distress-none  Gait:  Moderate limp  Wound:  Healed lateral incision  Range of motion:  Moderately limited with endpoint pain  Distal perfusion:  Intact  Distal neurologic:  Intact    Imaging:  Recent radiographs and MRI show a Press-Fit hip replacement.  Is eccentric placement of the femoral head in the socket.  No radiolucency on radiographs.  MRI shows cystic changes.      Assessment:             1. Chronic hip pain after total replacement of right hip joint        This represents failure of the polyethylene liner due to wear.        Plan:          No follow-ups on file.      I explained my diagnostic impression and the reasoning behind it in detail, using layman's terms.  Models and/or pictures were used to help in the explanation.    Check iron levels and inflammatory markers    We will need to consider at least liner exchange.  We will start inquiry to identify model of socket.    Total revision may be needed, depending on intraoperative findings.

## 2025-03-06 ENCOUNTER — OFFICE VISIT (OUTPATIENT)
Dept: ORTHOPEDICS | Facility: CLINIC | Age: 55
End: 2025-03-06
Payer: MEDICAID

## 2025-03-06 DIAGNOSIS — M25.551 CHRONIC HIP PAIN AFTER TOTAL REPLACEMENT OF RIGHT HIP JOINT: Primary | ICD-10-CM

## 2025-03-06 DIAGNOSIS — G89.29 CHRONIC HIP PAIN AFTER TOTAL REPLACEMENT OF RIGHT HIP JOINT: Primary | ICD-10-CM

## 2025-03-06 DIAGNOSIS — Z96.641 CHRONIC HIP PAIN AFTER TOTAL REPLACEMENT OF RIGHT HIP JOINT: Primary | ICD-10-CM

## 2025-03-06 NOTE — PROGRESS NOTES
Audio Only Telehealth Visit     The patient location is:  Home  The chief complaint leading to consultation is:  Painful right hip replacement  Visit type: Virtual visit with audio only (telephone)  Total time spent in medical discussion with patient:  10 minutes   Total time spent on date of the encounter:  30 minutes        The reason for the audio only service rather than synchronous audio and video virtual visit was related to technical difficulties or patient preference/necessity.       Each patient to whom I provide medical services by telemedicine is:  (1) informed of the relationship between the physician and patient and the respective role of any other health care provider with respect to management of the patient; and (2) notified that they may decline to receive medical services by telemedicine and may withdraw from such care at any time. Patient verbally consented to receive this service via voice-only telephone call.       HPI:  Patient recently presented with eccentric wear of longstanding right hip replacement.  She has about 1 year of pain after initially having satisfactory result.  Radiographs show eccentric wear of the acetabulum.  MRI show periarticular bone lysis consistent with polyethylene wear.  The patient reports no interval change in symptoms.  Recent lab work showed normal inflammatory markers and no evidence of metallosis     Assessment and plan:  The patient has symptoms secondary to impending failure of the polyethylene.  If possible, revision of the polyethylene liner is likely to result in relief of symptoms.  Intraoperative findings may mandate complete revision.  I explained my assessment to the patient.      The process of right revision total hip replacement was explained to the patient.  I explained that I would attempt to only revise the acetabular liner but might need to do total revision, depending on intraoperative findings.  The nature of the procedure was explained using a  model.  The expected perioperative clinical course and period of recovery as applicable to the patient's condition was described.  The risks including death, infection, thromboembolic events, instability, leg length discrepancy, persistent pain/stiffness, fracture around implant and implant failure due to wear or loosening, with possible need for reoperation, were all explained.  The possibility and expectations of continued nonsurgical care were reviewed.  The patient understands and wishes to proceed with the recommended operation.                        This service was not originating from a related E/M service provided within the previous 7 days nor will  to an E/M service or procedure within the next 24 hours or my soonest available appointment.  Prevailing standard of care was able to be met in this audio-only visit.

## 2025-03-24 ENCOUNTER — TELEPHONE (OUTPATIENT)
Dept: ORTHOPEDICS | Facility: CLINIC | Age: 55
End: 2025-03-24
Payer: MEDICAID

## 2025-03-24 DIAGNOSIS — Z96.649 S/P REVISION OF TOTAL HIP: Primary | ICD-10-CM

## 2025-03-24 NOTE — TELEPHONE ENCOUNTER
Spoke with Diana, pt stated she had a missed call from Dr Dean.  Per pt, MD was awaiting medical records for pt previous replacement. Informed pt, message will be forward to MD re: call back. She v/u.

## 2025-03-24 NOTE — TELEPHONE ENCOUNTER
----- Message from Monica sent at 3/24/2025  1:23 PM CDT -----  Type:  Patient Returning CallWho Called:Pt Who Left Message for Patient:DR Zurita the patient know what this is regarding?:yes Would the patient rather a call back or a response via [facility]? Call back Best Call Back Number: [phone] Information:

## 2025-03-26 NOTE — TELEPHONE ENCOUNTER
Please set this patient up for revision of right hip replacement in May in [place].  She needs to have her history and physical done on a Tuesday what I am in [place] so I can have her sign consent and talk to her.  Thanks

## 2025-03-26 NOTE — TELEPHONE ENCOUNTER
Spoke with Mrs Sesay, surgery has been scheduled in [place] for right hip revision on 05/14. Patient has been scheduled with PA for pre op, at that time she will sign consents.

## 2025-04-07 ENCOUNTER — TELEPHONE (OUTPATIENT)
Dept: ORTHOPEDICS | Facility: CLINIC | Age: 55
End: 2025-04-07
Payer: MEDICAID

## 2025-04-07 NOTE — TELEPHONE ENCOUNTER
Spoke with patient. Informed her that the joint camp class is required prior to surgery with Dr. Dean. Patient verbalized understanding.

## 2025-04-07 NOTE — TELEPHONE ENCOUNTER
----- Message from Hannah sent at 4/7/2025 10:58 AM CDT -----  Type:  Needs Medical AdviceWho Called: Arlin Call Back Number: [phone] Information: Patient is requesting a call back in regards [facility] and if this is mandatory due to travel distance

## 2025-04-21 DIAGNOSIS — Z96.641 CHRONIC HIP PAIN AFTER TOTAL REPLACEMENT OF RIGHT HIP JOINT: Primary | ICD-10-CM

## 2025-04-21 DIAGNOSIS — G89.29 CHRONIC HIP PAIN AFTER TOTAL REPLACEMENT OF RIGHT HIP JOINT: Primary | ICD-10-CM

## 2025-04-21 DIAGNOSIS — M25.551 CHRONIC HIP PAIN AFTER TOTAL REPLACEMENT OF RIGHT HIP JOINT: Primary | ICD-10-CM

## 2025-04-21 RX ORDER — ACETAMINOPHEN 500 MG
1000 TABLET ORAL
OUTPATIENT
Start: 2025-04-21 | End: 2025-04-21

## 2025-04-21 RX ORDER — CEFAZOLIN SODIUM 2 G/50ML
2 SOLUTION INTRAVENOUS
OUTPATIENT
Start: 2025-04-21

## 2025-04-21 RX ORDER — NAPROXEN 500 MG/1
500 TABLET ORAL
OUTPATIENT
Start: 2025-04-21 | End: 2025-04-21

## 2025-04-21 RX ORDER — SODIUM CHLORIDE 0.9 % (FLUSH) 0.9 %
3 SYRINGE (ML) INJECTION EVERY 8 HOURS
OUTPATIENT
Start: 2025-04-21

## 2025-04-21 RX ORDER — MUPIROCIN 20 MG/G
OINTMENT TOPICAL
OUTPATIENT
Start: 2025-04-21

## 2025-04-22 ENCOUNTER — OFFICE VISIT (OUTPATIENT)
Dept: ORTHOPEDICS | Facility: CLINIC | Age: 55
End: 2025-04-22
Payer: MEDICAID

## 2025-04-22 DIAGNOSIS — Z96.641 CHRONIC HIP PAIN AFTER TOTAL REPLACEMENT OF RIGHT HIP JOINT: Primary | ICD-10-CM

## 2025-04-22 DIAGNOSIS — G89.29 CHRONIC HIP PAIN AFTER TOTAL REPLACEMENT OF RIGHT HIP JOINT: Primary | ICD-10-CM

## 2025-04-22 DIAGNOSIS — Z01.818 PREOP EXAMINATION: ICD-10-CM

## 2025-04-22 DIAGNOSIS — M25.551 CHRONIC HIP PAIN AFTER TOTAL REPLACEMENT OF RIGHT HIP JOINT: Primary | ICD-10-CM

## 2025-04-22 PROCEDURE — 99213 OFFICE O/P EST LOW 20 MIN: CPT | Mod: PBBFAC,PN

## 2025-04-22 PROCEDURE — 1159F MED LIST DOCD IN RCRD: CPT | Mod: CPTII,,,

## 2025-04-22 PROCEDURE — 99999 PR PBB SHADOW E&M-EST. PATIENT-LVL III: CPT | Mod: PBBFAC,,,

## 2025-04-22 PROCEDURE — 99214 OFFICE O/P EST MOD 30 MIN: CPT | Mod: S$PBB,,,

## 2025-04-22 PROCEDURE — 1160F RVW MEDS BY RX/DR IN RCRD: CPT | Mod: CPTII,,,

## 2025-04-22 NOTE — H&P (VIEW-ONLY)
Subjective:      Patient ID: Diana Sesay is a 55 y.o. female.    Chief Complaint: Right hip pain     HPI: Diana Sesay is a 55 y.o. female who presents to the clinic today for a patient optimization visit in preparation for a right total hip revision to be performed by Dr. Dean on 05/14/2025.  She has a significant history of right hip pain.  Pain is worse with activity and weight bearing.  Patient has experienced interference of ADLs due to increased pain and decreased range of motion. Patient has failed non-operative treatment including NSAIDs, activity modification, weight loss, and corticosteroid injections. Diana Sesay ambulates without an assistive device.. She was last seen and treated in the clinic on 2/11/2025. There has been no significant change in medical status since last visit.    Ambulating: unassisted  Diabetic:  No  Smoking:  She has never smoked.  History of DVT/PE: Negative      PMHx significant for:         - Anxiety        - HLD        - Insomnia    PAST MEDICAL HISTORY:    Past Medical History:   Diagnosis Date    Anxiety disorder, unspecified      PAST SURGICAL HISTORY:    History reviewed. No pertinent surgical history.    FAMILY HISTORY:    No family history on file.    SOCIAL HISTORY:    Social History     Occupational History    Not on file   Tobacco Use    Smoking status: Never    Smokeless tobacco: Never   Substance and Sexual Activity    Alcohol use: Not Currently    Drug use: Not Currently     Types: Marijuana     Comment: past-gummies    Sexual activity: Not Currently     Partners: Male      MEDICATIONS:   Current Medications[1]    ALLERGIES:   Review of patient's allergies indicates:   Allergen Reactions    Sudafed cold-allergy        Review of Systems:  Constitution: Negative for chills, fever and night sweats.   HENT: Negative for congestion and headaches.    Eyes: Negative for blurred vision or vision loss.  Cardiovascular: Negative for chest pain and  syncope.   Respiratory: Negative for cough and shortness of breath.    Endocrine: Negative for polydipsia, polyphagia and polyuria.   Hematologic/Lymphatic: Negative for bleeding problem. Does not bruise/bleed easily.   Skin: Negative for dry skin, itching and rash.   Musculoskeletal: See HPI.   Gastrointestinal: Negative for abdominal pain and bowel incontinence.   Genitourinary: Negative for bladder incontinence and nocturia.   Neurological: Negative for disturbances in coordination, loss of balance and seizures.   Psychiatric/Behavioral: Negative for depression. The patient does not have insomnia.    Allergic/Immunologic: Negative for hives and persistent infections.        Objective:      There were no vitals filed for this visit.    PHYSICAL EXAM:  General: Alert & oriented x3, well-developed and well-nourished, in no acute distress, sitting comfortably in the exam room.  Skin: Warm and dry. Capillary refill less than 2 seconds.   Head: Normocephalic and atraumatic.   Eyes: Sclera appear normal.   Nose: No deformities seen.   Ears: No deformities seen.   Neck: No tracheal deviation present.   Pulmonary/Chest: Breathing unlabored. Breath sounds normal.  Cardiovascular: Normal rate and regular rhythm.   Abdominal: Soft and nondistended.  Neurological: Alert and oriented to person, place, and time.   Psychiatric: Mood is pleasant and affect appropriate.     RIGHT HIP:        Body habitus is:  normal.         The patient walks with a limp.         The skin over the hip has a healed scar.        Tenderness:  no local tenderness.        Range of Motion:  moderately limited with pain at end range        Pain with rotation:  positive        Shortening/lengthening compared to the contralateral side:  exam deferred.        Pulses DP present, PT present.        Motor:  normal 5/5 strength in all tested muscle groups.         Sensory:  normal.    Imaging:         Dr. Dean previous imaging read: previous radiographs and  MRI show a Press-Fit hip replacement.  Is eccentric placement of the femoral head in the socket.  No radiolucency on radiographs.  MRI shows cystic changes.        Assessment:       1. Chronic hip pain after total replacement of right hip joint    2. Preop examination         Plan:       Orders Placed This Encounter    COMMODE FOR HOME USE    TRANSFER TUB BENCH FOR HOME USE    WALKER FOR HOME USE    CBC Auto Differential    Comprehensive Metabolic Panel    Hemoglobin A1C    Ambulatory referral/consult to Home Health     Right total hip revision scheduled for  05/14/2025.    Discharge planning was discussed and patient plans to go home the same day of surgery if possible.    Post-op Home Health and DME has been ordered including standard walker, bedside commode, and chair for bath/shower.     During today's Patient Optimization Visit all consultant notes, medications, imaging, EKG, and lab work has been reviewed. Discharge planning was discussed and Home Health has been ordered. Any additional testing or consults needed for medical clearance has been ordered. Pre, vee, and post operative procedures and expectations discussed. All questions were answered.   Diana Sesay will contact us if there are any questions, concerns, or changes in medical status prior to surgery.    30 minutes were spent on this encounter. This includes face to face time and non-face to face time preparing to see the patient (eg, review of tests), obtaining and/or reviewing separately obtained history, documenting clinical information in the electronic or other health record, independently interpreting results and communicating results to the patient/family/caregiver, or care coordinator.     Walker Justification: The mobility limitation cannot be sufficiently resolved by the use of a cane.   Patient's functional mobility deficit can be sufficiently resolved with the use of a (Rolling Walker or Walker).  Patient's mobility limitation  significantly impairs their ability to participate in one of more activities of daily living.  The use of a (RW or Walker) will significantly improve the patient's ability to participate in MRADLS and the patient will use it on regular basis in the home.     All of the patient's questions were answered and the patient will contact us if they have any questions or concerns in the interim.      Beba Tate PA-C  [facility]  Orthopedic Surgery    Medical Dictation software was used during the dictation of portions or the entirety of this medical record.  Phonetic or grammatic errors may exist due to the use of this software. For clarification, refer to the author of the document.            [1]   Current Outpatient Medications:     atorvastatin (LIPITOR) 20 MG tablet, Take 1 tablet every day by oral route at bedtime for 90 days., Disp: , Rfl:     cetirizine (ZYRTEC) 10 MG tablet, Take 1 tablet (10 mg total) by mouth every evening., Disp: 30 tablet, Rfl: 0    clonazePAM (KLONOPIN) 1 MG tablet, Take 1 tablet by mouth 3 (three) times daily., Disp: , Rfl:     cyclobenzaprine (FLEXERIL) 10 MG tablet, Take 10 mg by mouth 2 (two) times daily as needed., Disp: , Rfl:     desvenlafaxine succinate (PRISTIQ) 50 MG Tb24, Take 1 tablet (50 mg total) by mouth once daily., Disp: 30 tablet, Rfl: 0    HYDROcodone-acetaminophen (NORCO) 5-325 mg per tablet, Take 2 tablets by mouth 2 (two) times daily., Disp: , Rfl:     loratadine (CLARITIN) 10 mg tablet, Take 1 tablet by mouth once daily., Disp: , Rfl:     meloxicam (MOBIC) 15 MG tablet, Take 15 mg by mouth., Disp: , Rfl:     montelukast (SINGULAIR) 10 mg tablet, Take 10 mg by mouth., Disp: , Rfl:     pantoprazole (PROTONIX) 40 MG tablet, Take 1 tablet every day by oral route as directed for 30 days., Disp: , Rfl:

## 2025-04-24 ENCOUNTER — LAB VISIT (OUTPATIENT)
Dept: LAB | Facility: HOSPITAL | Age: 55
End: 2025-04-24
Payer: MEDICAID

## 2025-04-24 DIAGNOSIS — Z01.818 PREOP EXAMINATION: ICD-10-CM

## 2025-04-24 DIAGNOSIS — G89.29 CHRONIC HIP PAIN AFTER TOTAL REPLACEMENT OF RIGHT HIP JOINT: ICD-10-CM

## 2025-04-24 DIAGNOSIS — Z96.641 CHRONIC HIP PAIN AFTER TOTAL REPLACEMENT OF RIGHT HIP JOINT: ICD-10-CM

## 2025-04-24 DIAGNOSIS — M25.551 CHRONIC HIP PAIN AFTER TOTAL REPLACEMENT OF RIGHT HIP JOINT: ICD-10-CM

## 2025-04-24 LAB
ALBUMIN SERPL-MCNC: 4.4 G/DL (ref 3.5–5)
ALBUMIN/GLOB SERPL: 1.3 RATIO (ref 1.1–2)
ALP SERPL-CCNC: 74 UNIT/L (ref 40–150)
ALT SERPL-CCNC: 21 UNIT/L (ref 0–55)
ANION GAP SERPL CALC-SCNC: 10 MEQ/L
AST SERPL-CCNC: 19 UNIT/L (ref 11–45)
BASOPHILS # BLD AUTO: 0.02 X10(3)/MCL
BASOPHILS NFR BLD AUTO: 0.5 %
BILIRUB SERPL-MCNC: 0.6 MG/DL
BUN SERPL-MCNC: 21 MG/DL (ref 9.8–20.1)
CALCIUM SERPL-MCNC: 9.9 MG/DL (ref 8.4–10.2)
CHLORIDE SERPL-SCNC: 105 MMOL/L (ref 98–107)
CO2 SERPL-SCNC: 27 MMOL/L (ref 22–29)
CREAT SERPL-MCNC: 0.67 MG/DL (ref 0.55–1.02)
CREAT/UREA NIT SERPL: 31
EOSINOPHIL # BLD AUTO: 0.19 X10(3)/MCL (ref 0–0.9)
EOSINOPHIL NFR BLD AUTO: 4.5 %
ERYTHROCYTE [DISTWIDTH] IN BLOOD BY AUTOMATED COUNT: 11.7 % (ref 11.5–17)
EST. AVERAGE GLUCOSE BLD GHB EST-MCNC: 99.7 MG/DL
GFR SERPLBLD CREATININE-BSD FMLA CKD-EPI: >60 ML/MIN/1.73/M2
GLOBULIN SER-MCNC: 3.4 GM/DL (ref 2.4–3.5)
GLUCOSE SERPL-MCNC: 91 MG/DL (ref 74–100)
HBA1C MFR BLD: 5.1 %
HCT VFR BLD AUTO: 44.1 % (ref 37–47)
HGB BLD-MCNC: 14.4 G/DL (ref 12–16)
IMM GRANULOCYTES # BLD AUTO: 0.04 X10(3)/MCL (ref 0–0.04)
IMM GRANULOCYTES NFR BLD AUTO: 0.9 %
LYMPHOCYTES # BLD AUTO: 1.39 X10(3)/MCL (ref 0.6–4.6)
LYMPHOCYTES NFR BLD AUTO: 32.9 %
MCH RBC QN AUTO: 29.7 PG (ref 27–31)
MCHC RBC AUTO-ENTMCNC: 32.7 G/DL (ref 33–36)
MCV RBC AUTO: 90.9 FL (ref 80–94)
MONOCYTES # BLD AUTO: 0.3 X10(3)/MCL (ref 0.1–1.3)
MONOCYTES NFR BLD AUTO: 7.1 %
NEUTROPHILS # BLD AUTO: 2.29 X10(3)/MCL (ref 2.1–9.2)
NEUTROPHILS NFR BLD AUTO: 54.1 %
NRBC BLD AUTO-RTO: 0 %
PLATELET # BLD AUTO: 237 X10(3)/MCL (ref 130–400)
PMV BLD AUTO: 9.4 FL (ref 7.4–10.4)
POTASSIUM SERPL-SCNC: 4.6 MMOL/L (ref 3.5–5.1)
PROT SERPL-MCNC: 7.8 GM/DL (ref 6.4–8.3)
RBC # BLD AUTO: 4.85 X10(6)/MCL (ref 4.2–5.4)
SODIUM SERPL-SCNC: 142 MMOL/L (ref 136–145)
WBC # BLD AUTO: 4.23 X10(3)/MCL (ref 4.5–11.5)

## 2025-04-24 PROCEDURE — 36415 COLL VENOUS BLD VENIPUNCTURE: CPT

## 2025-04-24 PROCEDURE — 83036 HEMOGLOBIN GLYCOSYLATED A1C: CPT

## 2025-04-24 PROCEDURE — 80053 COMPREHEN METABOLIC PANEL: CPT

## 2025-04-24 PROCEDURE — 85025 COMPLETE CBC W/AUTO DIFF WBC: CPT

## 2025-04-30 ENCOUNTER — TELEPHONE (OUTPATIENT)
Dept: ORTHOPEDICS | Facility: CLINIC | Age: 55
End: 2025-04-30
Payer: MEDICAID

## 2025-04-30 NOTE — TELEPHONE ENCOUNTER
----- Message from Hannah sent at 4/30/2025  4:07 PM CDT -----  Type:  Needs Medical AdviceWho Called: Arlin Call Back Number: [phone] Information: Patient is requesting a call back

## 2025-05-01 ENCOUNTER — TELEPHONE (OUTPATIENT)
Dept: ORTHOPEDICS | Facility: CLINIC | Age: 55
End: 2025-05-01
Payer: MEDICAID

## 2025-05-01 NOTE — TELEPHONE ENCOUNTER
----- Message from Justin sent at 5/1/2025 10:43 AM CDT -----  Regarding: pt  Name of Who is Calling:Pt What is the request in detail: Requesting script for shower chair without back on itPlease send to [facility] pt stated insurance will cover for (Without back) shower chair  [phone] the clinic reply by [facility]: noWhat Number to Call Back if not in [facility]:Telephone Information:Mobile          [phone]

## 2025-05-05 ENCOUNTER — TELEPHONE (OUTPATIENT)
Dept: ORTHOPEDICS | Facility: CLINIC | Age: 55
End: 2025-05-05
Payer: MEDICAID

## 2025-05-05 DIAGNOSIS — Z01.818 PREOP EXAMINATION: Primary | ICD-10-CM

## 2025-05-05 DIAGNOSIS — M25.551 CHRONIC HIP PAIN AFTER TOTAL REPLACEMENT OF RIGHT HIP JOINT: ICD-10-CM

## 2025-05-05 DIAGNOSIS — M25.551 RIGHT HIP PAIN: ICD-10-CM

## 2025-05-05 DIAGNOSIS — G89.29 CHRONIC HIP PAIN AFTER TOTAL REPLACEMENT OF RIGHT HIP JOINT: ICD-10-CM

## 2025-05-05 DIAGNOSIS — Z96.641 CHRONIC HIP PAIN AFTER TOTAL REPLACEMENT OF RIGHT HIP JOINT: ICD-10-CM

## 2025-05-05 NOTE — TELEPHONE ENCOUNTER
Spoke with Beba, with [facility], states new order needs to be entered for shower chair without back. As pt insurance will not cover the transfer bench.

## 2025-05-05 NOTE — TELEPHONE ENCOUNTER
----- Message from Erwin sent at 5/5/2025 11:00 AM CDT -----  Type: General Call Back Name of Caller:pt Reason patient is calling in regards to getting an chair for the shower with no back and arms sent over to [facility], Fax # [phone] , Phone# [phone] the patient rather a call back or a response via [facility]? Call Best Call Back Number: [phone] Information:

## 2025-05-07 ENCOUNTER — TELEPHONE (OUTPATIENT)
Dept: ORTHOPEDICS | Facility: CLINIC | Age: 55
End: 2025-05-07
Payer: MEDICAID
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
## 2025-05-08 ENCOUNTER — ANESTHESIA EVENT (OUTPATIENT)
Dept: SURGERY | Facility: HOSPITAL | Age: 55
End: 2025-05-08
Payer: MEDICAID

## 2025-05-08 ENCOUNTER — HOSPITAL ENCOUNTER (OUTPATIENT)
Dept: PREADMISSION TESTING | Facility: HOSPITAL | Age: 55
Discharge: HOME | End: 2025-05-08
Attending: NURSE PRACTITIONER
Payer: MEDICAID

## 2025-05-08 PROBLEM — M81.0 OSTEOPOROSIS: Status: ACTIVE | Noted: 2020-02-09

## 2025-05-08 PROBLEM — E78.5 HYPERLIPIDEMIA: Status: ACTIVE | Noted: 2021-05-19

## 2025-05-08 PROBLEM — M19.90 ARTHRITIS: Status: ACTIVE | Noted: 2024-08-29

## 2025-05-08 PROBLEM — E55.9 VITAMIN D DEFICIENCY: Status: ACTIVE | Noted: 2022-03-23

## 2025-05-08 PROBLEM — E53.8 FOLIC ACID DEFICIENCY: Status: ACTIVE | Noted: 2022-03-23

## 2025-05-08 PROBLEM — F41.9 ANXIETY: Status: ACTIVE | Noted: 2018-11-04

## 2025-05-08 PROBLEM — M50.30 DDD (DEGENERATIVE DISC DISEASE), CERVICAL: Status: ACTIVE | Noted: 2018-08-12

## 2025-05-08 PROBLEM — F31.9 BIPOLAR DISORDER: Status: ACTIVE | Noted: 2024-04-23

## 2025-05-08 PROBLEM — G56.30: Status: ACTIVE | Noted: 2023-08-08

## 2025-05-08 PROBLEM — F32.A DEPRESSIVE DISORDER: Status: ACTIVE | Noted: 2020-02-09

## 2025-05-08 PROBLEM — J30.9 ALLERGIC RHINITIS: Status: ACTIVE | Noted: 2020-02-09

## 2025-05-08 PROBLEM — T84.050A: Status: ACTIVE | Noted: 2024-10-28

## 2025-05-08 RX ORDER — LEVOMEFOLATE MAGNESIUM, LEUCOVORIN, FOLIC ACID, FERROUS CYSTEINE GLYCINATE, MAGNESIUM ASCORBATE, ZINC ASCORBATE, COCARBOXYLASE, FLAVIN ADENINE DINUCLEOTIDE, NADH, PYRIDOXAL PHOSPHATE ANHYDROUS, COBAMAMIDE, BETAINE, MAGNESIUM L-THREONATE, 1,2-DOCOSAHEXANOYL-SN-GLYCERO-3-PHOSPHOSERINE CALCIUM, 1,2-ICOSAPENTOYL-SN-GLYCERO-3-PHOSPHOSERINE CALCIUM, AND PHOSPHATIDYL SERINE 5.23; 2.5; 1; 13.6; 24; 1; 25; 25; 25; 25; 50; 500; 1; 6.4; 800; 12 MG/1; MG/1; MG/1; MG/1; MG/1; MG/1; UG/1; UG/1; UG/1; UG/1; UG/1; UG/1; MG/1; MG/1; UG/1; MG/1
1 CAPSULE, DELAYED RELEASE PELLETS ORAL DAILY
COMMUNITY
Start: 2025-04-29

## 2025-05-08 RX ORDER — QUETIAPINE FUMARATE 25 MG/1
25 TABLET, FILM COATED ORAL NIGHTLY
COMMUNITY
Start: 2024-07-30

## 2025-05-08 NOTE — DISCHARGE INSTRUCTIONS
Your surgery is scheduled for 5/14/25.    Please report to Hospital Front Lobby on the 1st Floor at 0930 a.m.    THIS TIME IS SUBJECT TO CHANGE.  YOU WILL RECEIVE A PHONE CALL THE DAY BEFORE SURGERY BY 3:30 PM TO CONFIRM YOUR TIME OF ARRIVAL.  IF YOU HAVE NOT RECEIVED A PHONE CALL BY 3:30 PM THE DAY BEFORE YOUR SURGERY PLEASE CALL [phone]     INSTRUCTIONS IMPORTANT!!!  ¨Stop full meals 8 hours prior to arrival, but you can consume clear liquids up to 2 hours prior to arrival time. Clear liquids include Gatorade, water, soda, black coffee or tea (no milk or creamer), and clear juices only.     Please take Klonopin and Pantoprazole the morning of surgery.          ____  Do not wear makeup, including mascara.  ____  No powder, lotions or creams to surgical area.  ____  Please remove all jewelry, including piercings and leave at home.  ____  No money or valuables needed. Please leave at home.  ____  Please bring any documents given by your doctor.  ____  If going home the same day, arrange for a ride home. You will not be able to             drive if Anesthesia was used.  ____  Children under 18 years require a parent / guardian present the entire time             they are in surgery / recovery.  ____  Wear loose fitting clothing. Allow for dressings, bandages.  ____  Stop Aspirin and blood thinners as directed by prescribing provider.  ____  Do not take any herbal, vitamins, and or supplements 2 weeks prior to surgery.  ____  Stop NSAIDS (Naproxen, Aleve, Voltaren, Celebrex, Melxoicam), Goody's, and BC powder 7 days prior to surgery.  ____  Wash the surgical area with Hibiclens or Dial Antibacterial bar soap the night before surgery, and again the             morning of surgery.  Be sure to rinse hibiclens or Dial Antibacterial bar soap off completely (if instructed by   nurse).  ____  If you take diabetic medication including Metformin, Glimepiride, Glipizide, Glyburide, Byetta, Januvia, Actos, do not take am  of surgery unless            instructed by Doctor.  ____  Call MD for temperature above 101 degrees or any other signs of infection such as Urinary (bladder) infection, Upper respiratory infection, skin boils,             etc.  ____ Do Not wear your contact lenses the day of your procedure.  You may wear your glasses.      ____ Do not shave surgical site for 3 days prior to surgery.  ____ Practice Good hand washing before, during, and after procedure.  ____ Hold the following medication for 3 days prior to surgery:    Invokana (Canagliflozin)     Synjardy XR (jardiance + metformin)  Farxiga (Dapagliflozin)     Segluroment (steglarto + metformin)  Jardiance (Empagliflozin)     Qtern (farxiga + saxagliptin)   Steglatro (Ertugliflozin)     Glyxambi (jardiance + linagliptin)  Benzavvy (Bexagliflozin)     Steglujan (steglarto + sitagliptin)  Inpefa (Sotagliflozin)      Xigduo (farxiga + metformin)   Invokamet/Invokamet XR (invokana + Metformin)       I have read or had read and explained to me, and understand the above information.  Additional comments or instructions:  For additional questions call [phone]      ANESTHESIA SIDE EFFECTS  -For the first 24 hours after surgery:  Do not drive, use heavy equipment, make important decisions, or drink alcohol  -It is normal to feel sleepy for several hours.  Rest until you are more awake.  -Have someone stay with you, if needed.  They can watch for problems and help keep you safe.  -Some possible post anesthesia side effects include: nausea and vomiting, sore throat and hoarseness, sleepiness, and dizziness.        Pre-Op Bathing Instructions    Before surgery, you can play an important role in your own health.    Because skin is not sterile, we need to be sure that your skin is as free of germs as possible. By following the instructions below, you can reduce the number of germs on your skin before surgery.    IMPORTANT: You will need to shower with a special soap called  Hibiclens*, available at any pharmacy.  If you are allergic to Chlorhexidine (the antiseptic in Hibiclens), use an antibacterial soap such as Dial Soap for your preoperative shower.  You will shower with Hibiclens both the night before your surgery and the morning of your surgery.  Do not use Hibiclens on the head, face or genitals to avoid injury to those areas.    STEP #1: THE NIGHT BEFORE YOUR SURGERY     Do not shave the area of your body where your surgery will be performed.  Shower and wash your hair and body as usual with your normal soap and shampoo.  Rinse your hair and body thoroughly after you shower to remove all soap residue.  With your hand, apply one packet of Hibiclens soap to the surgical site.   Wash the site gently for five (5) minutes. Do not scrub your skin too hard.   Do not wash with your regular soap after Hibiclens is used.  Rinse your body thoroughly.  Pat yourself dry with a clean, soft towel.  Do not use lotion, cream, or powder.  Wear clean clothes.    STEP #2: THE MORNING OF YOUR SURGERY     Repeat Step #1.    * Not to be used by people allergic to Chlorhexidine.

## 2025-05-08 NOTE — ANESTHESIA PREPROCEDURE EVALUATION
05/08/2025  Diana Sesay is a 55 y.o., female scheduled for REVISION, ARTHROPLASTY, TOTAL HIP (Right: Hip) .    Past Medical History:   Diagnosis Date    Anxiety disorder, unspecified      No past surgical history on file.    Review of patient's allergies indicates:   Allergen Reactions    Sudafed cold-allergy      Current Outpatient Medications   Medication Instructions    atorvastatin (LIPITOR) 20 MG tablet Take 1 tablet every day by oral route at bedtime for 90 days.    cetirizine (ZYRTEC) 10 mg, Oral, Nightly    clonazePAM (KLONOPIN) 1 MG tablet 1 tablet, 3 times daily    cyclobenzaprine (FLEXERIL) 10 mg, 2 times daily PRN    ENBRACE HR 1.5 mg iron- 8.73 mg-6.4 mg capsule 1 capsule, Daily    HYDROcodone-acetaminophen (NORCO) 5-325 mg per tablet 2 tablets, 2 times daily    loratadine (CLARITIN) 10 mg tablet 1 tablet, Daily    meloxicam (MOBIC) 15 mg    montelukast (SINGULAIR) 10 mg    pantoprazole (PROTONIX) 40 MG tablet Take 1 tablet every day by oral route as directed for 30 days.    QUEtiapine (SEROQUEL) 25 mg, Nightly       Pre-op Assessment    I have reviewed the Patient Summary Reports.     I have reviewed the Nursing Notes.    I have reviewed the Medications.     Review of Systems  Anesthesia Hx:  No problems with previous Anesthesia              Personal Hx of Anesthesia complications                   Back Pain and after spinal/epidural anesthesia and residual back painDifficult or Failed Neuraxial Anesthesia (left side did not take during an epidural for first child no issues after)  Social:  No Alcohol Use, Non-Smoker       EENT/Dental:  chronic allergic rhinitis           Cardiovascular:  Exercise tolerance: good          Denies Angina.     hyperlipidemia                               Pulmonary:  Pulmonary Normal      Denies Shortness of breath.                  Hepatic/GI:  Hepatic/GI  Normal                    Musculoskeletal:         Spine Disorders: cervical Disc disease and Degenerative disease           Neurological:  Denies TIA.  Denies CVA. Neuromuscular Disease,  Headaches Denies Seizures.                                Endocrine:  Endocrine Normal            Psych:  Psychiatric History anxiety depression                Physical Exam  General: Cooperative and Oriented    Airway:  Neck ROM: Normal ROM    Dental:  Intact      Lab Results   Component Value Date    WBC 4.23 (L) 04/24/2025    HGB 14.4 04/24/2025    HCT 44.1 04/24/2025     04/24/2025    CHOL 196 05/07/2024    TRIG 61 05/07/2024    HDL 56 05/07/2024    ALT 21 04/24/2025    AST 19 04/24/2025     04/24/2025    K 4.6 04/24/2025     04/24/2025    CREATININE 0.67 04/24/2025    BUN 21.0 (H) 04/24/2025    CO2 27 04/24/2025    TSH 0.475 05/06/2024    INR 1.0 07/24/2019    HGBA1C 5.1 04/24/2025     Results for orders placed or performed during the hospital encounter of 06/20/24   EKG 12-lead    Collection Time: 06/20/24  2:57 PM   Result Value Ref Range    QRS Duration 86 ms    OHS QTC Calculation 465 ms    Narrative    Test Reason : R10.13,    Vent. Rate : 071 BPM     Atrial Rate : 071 BPM     P-R Int : 144 ms          QRS Dur : 086 ms      QT Int : 428 ms       P-R-T Axes : 063 071 065 degrees     QTc Int : 465 ms    Normal sinus rhythm with sinus arrhythmia  Normal ECG  No previous ECGs available  Confirmed by Jordy Roa MD [id number]) on 6/20/2024 9:56:01 PM    Referred By: [email or web address]   SELF           Confirmed By:Jordy Roa MD         Anesthesia Plan  Type of Anesthesia, risks & benefits discussed:    Anesthesia Type: Spinal, Regional, Gen Natural Airway  Intra-op Monitoring Plan: Standard ASA Monitors  Post Op Pain Control Plan: multimodal analgesia  Induction:  IV  ASA Score: 2  Anesthesia Plan Notes: Anesthesia consent to be signed prior to surgery 5/14/25      Ready For Surgery From Anesthesia Perspective.      .

## 2025-05-08 NOTE — PRE-PROCEDURE INSTRUCTIONS
Willy.    Allergies, medical, surgical, family and psychosocial histories reviewed with patient. Periop plan of care reviewed. Preop instructions given, including medications to take and to hold. Hibiclens soap and instructions on use given. Time allotted for questions to be addressed.      Arrival time 0930.    Please take Klonopin and Pantoprazole the morning of surgery.      Surgery instructions reviewed and surgery booklet sent or emailed to the patient unable to send not no portal and no email.

## 2025-05-14 ENCOUNTER — HOSPITAL ENCOUNTER (OUTPATIENT)
Facility: HOSPITAL | Age: 55
Discharge: HOME HEALTH | End: 2025-05-15
Attending: ORTHOPAEDIC SURGERY | Admitting: ORTHOPAEDIC SURGERY
Payer: MEDICAID

## 2025-05-14 ENCOUNTER — ANESTHESIA (OUTPATIENT)
Dept: SURGERY | Facility: HOSPITAL | Age: 55
End: 2025-05-14
Payer: MEDICAID

## 2025-05-14 DIAGNOSIS — Z96.641 HISTORY OF RIGHT HIP REPLACEMENT: Primary | ICD-10-CM

## 2025-05-14 DIAGNOSIS — M25.551 CHRONIC HIP PAIN AFTER TOTAL REPLACEMENT OF RIGHT HIP JOINT: ICD-10-CM

## 2025-05-14 DIAGNOSIS — G89.29 CHRONIC HIP PAIN AFTER TOTAL REPLACEMENT OF RIGHT HIP JOINT: ICD-10-CM

## 2025-05-14 DIAGNOSIS — Z96.641 CHRONIC HIP PAIN AFTER TOTAL REPLACEMENT OF RIGHT HIP JOINT: ICD-10-CM

## 2025-05-14 PROCEDURE — 97165 OT EVAL LOW COMPLEX 30 MIN: CPT

## 2025-05-14 PROCEDURE — 63600175 PHARM REV CODE 636 W HCPCS: Performed by: ORTHOPAEDIC SURGERY

## 2025-05-14 PROCEDURE — 71000039 HC RECOVERY, EACH ADD'L HOUR: Performed by: ORTHOPAEDIC SURGERY

## 2025-05-14 PROCEDURE — 63600175 PHARM REV CODE 636 W HCPCS: Performed by: NURSE ANESTHETIST, CERTIFIED REGISTERED

## 2025-05-14 PROCEDURE — 94760 N-INVAS EAR/PLS OXIMETRY 1: CPT

## 2025-05-14 PROCEDURE — 36000710: Performed by: ORTHOPAEDIC SURGERY

## 2025-05-14 PROCEDURE — 25000003 PHARM REV CODE 250: Performed by: ORTHOPAEDIC SURGERY

## 2025-05-14 PROCEDURE — 27201423 OPTIME MED/SURG SUP & DEVICES STERILE SUPPLY: Performed by: ORTHOPAEDIC SURGERY

## 2025-05-14 PROCEDURE — 25000003 PHARM REV CODE 250: Performed by: NURSE ANESTHETIST, CERTIFIED REGISTERED

## 2025-05-14 PROCEDURE — 63600175 PHARM REV CODE 636 W HCPCS: Performed by: STUDENT IN AN ORGANIZED HEALTH CARE EDUCATION/TRAINING PROGRAM

## 2025-05-14 PROCEDURE — 71000033 HC RECOVERY, INTIAL HOUR: Performed by: ORTHOPAEDIC SURGERY

## 2025-05-14 PROCEDURE — 27137 REVISE HIP JOINT REPLACEMENT: CPT | Mod: 52,RT,, | Performed by: ORTHOPAEDIC SURGERY

## 2025-05-14 PROCEDURE — C1713 ANCHOR/SCREW BN/BN,TIS/BN: HCPCS | Performed by: ORTHOPAEDIC SURGERY

## 2025-05-14 PROCEDURE — 37000008 HC ANESTHESIA 1ST 15 MINUTES: Performed by: ORTHOPAEDIC SURGERY

## 2025-05-14 PROCEDURE — 37000009 HC ANESTHESIA EA ADD 15 MINS: Performed by: ORTHOPAEDIC SURGERY

## 2025-05-14 PROCEDURE — 97161 PT EVAL LOW COMPLEX 20 MIN: CPT

## 2025-05-14 PROCEDURE — 97530 THERAPEUTIC ACTIVITIES: CPT

## 2025-05-14 PROCEDURE — C1776 JOINT DEVICE (IMPLANTABLE): HCPCS | Performed by: ORTHOPAEDIC SURGERY

## 2025-05-14 PROCEDURE — 36000711: Performed by: ORTHOPAEDIC SURGERY

## 2025-05-14 DEVICE — IMPLANTABLE DEVICE: Type: IMPLANTABLE DEVICE | Site: HIP | Status: FUNCTIONAL

## 2025-05-14 DEVICE — TOBRA FULL DOSE ANTIBIOTIC BONE CEMENT, 10 PACK CATALOG NUMBER IS 6197-9-010
Type: IMPLANTABLE DEVICE | Site: HIP | Status: FUNCTIONAL
Brand: SIMPLEX

## 2025-05-14 RX ORDER — AMOXICILLIN 250 MG
1 CAPSULE ORAL 2 TIMES DAILY
Status: DISCONTINUED | OUTPATIENT
Start: 2025-05-14 | End: 2025-05-15 | Stop reason: HOSPADM

## 2025-05-14 RX ORDER — LIDOCAINE HYDROCHLORIDE 10 MG/ML
1 INJECTION, SOLUTION EPIDURAL; INFILTRATION; INTRACAUDAL; PERINEURAL ONCE
Status: DISCONTINUED | OUTPATIENT
Start: 2025-05-14 | End: 2025-05-14 | Stop reason: HOSPADM

## 2025-05-14 RX ORDER — PROCHLORPERAZINE EDISYLATE 5 MG/ML
5 INJECTION INTRAMUSCULAR; INTRAVENOUS EVERY 30 MIN PRN
Status: DISCONTINUED | OUTPATIENT
Start: 2025-05-14 | End: 2025-05-14 | Stop reason: HOSPADM

## 2025-05-14 RX ORDER — ACETAMINOPHEN 500 MG
1000 TABLET ORAL 3 TIMES DAILY
Status: DISCONTINUED | OUTPATIENT
Start: 2025-05-14 | End: 2025-05-15 | Stop reason: HOSPADM

## 2025-05-14 RX ORDER — NAPROXEN 500 MG/1
500 TABLET ORAL
Status: COMPLETED | OUTPATIENT
Start: 2025-05-14 | End: 2025-05-14

## 2025-05-14 RX ORDER — CEFAZOLIN 2 G/1
2 INJECTION, POWDER, FOR SOLUTION INTRAMUSCULAR; INTRAVENOUS
Status: COMPLETED | OUTPATIENT
Start: 2025-05-14 | End: 2025-05-14

## 2025-05-14 RX ORDER — SODIUM CHLORIDE 9 MG/ML
INJECTION, SOLUTION INTRAVENOUS CONTINUOUS
Status: DISCONTINUED | OUTPATIENT
Start: 2025-05-14 | End: 2025-05-15

## 2025-05-14 RX ORDER — ONDANSETRON 4 MG/1
4 TABLET, ORALLY DISINTEGRATING ORAL EVERY 6 HOURS PRN
Status: DISCONTINUED | OUTPATIENT
Start: 2025-05-14 | End: 2025-05-15 | Stop reason: HOSPADM

## 2025-05-14 RX ORDER — OXYCODONE HYDROCHLORIDE 5 MG/1
5 TABLET ORAL
Status: DISCONTINUED | OUTPATIENT
Start: 2025-05-14 | End: 2025-05-14 | Stop reason: HOSPADM

## 2025-05-14 RX ORDER — GLUCAGON 1 MG
1 KIT INJECTION
Status: DISCONTINUED | OUTPATIENT
Start: 2025-05-14 | End: 2025-05-14 | Stop reason: HOSPADM

## 2025-05-14 RX ORDER — HYDROMORPHONE HYDROCHLORIDE 1 MG/ML
0.5 INJECTION, SOLUTION INTRAMUSCULAR; INTRAVENOUS; SUBCUTANEOUS EVERY 6 HOURS PRN
Status: DISCONTINUED | OUTPATIENT
Start: 2025-05-14 | End: 2025-05-15 | Stop reason: HOSPADM

## 2025-05-14 RX ORDER — SODIUM CHLORIDE 0.9 % (FLUSH) 0.9 %
5 SYRINGE (ML) INJECTION
Status: DISCONTINUED | OUTPATIENT
Start: 2025-05-14 | End: 2025-05-15 | Stop reason: HOSPADM

## 2025-05-14 RX ORDER — MELOXICAM 7.5 MG/1
15 TABLET ORAL DAILY
Status: DISCONTINUED | OUTPATIENT
Start: 2025-05-15 | End: 2025-05-15 | Stop reason: HOSPADM

## 2025-05-14 RX ORDER — SODIUM CHLORIDE 0.9 % (FLUSH) 0.9 %
10 SYRINGE (ML) INJECTION DAILY PRN
Status: DISCONTINUED | OUTPATIENT
Start: 2025-05-14 | End: 2025-05-14 | Stop reason: HOSPADM

## 2025-05-14 RX ORDER — CLONAZEPAM 0.5 MG/1
1 TABLET ORAL 3 TIMES DAILY
Status: DISCONTINUED | OUTPATIENT
Start: 2025-05-14 | End: 2025-05-15 | Stop reason: HOSPADM

## 2025-05-14 RX ORDER — ATORVASTATIN CALCIUM 20 MG/1
20 TABLET, FILM COATED ORAL NIGHTLY
Status: DISCONTINUED | OUTPATIENT
Start: 2025-05-14 | End: 2025-05-15 | Stop reason: HOSPADM

## 2025-05-14 RX ORDER — CALCIUM CARBONATE 200(500)MG
500 TABLET,CHEWABLE ORAL EVERY 6 HOURS PRN
Status: DISCONTINUED | OUTPATIENT
Start: 2025-05-14 | End: 2025-05-15 | Stop reason: HOSPADM

## 2025-05-14 RX ORDER — SODIUM CHLORIDE 0.9 % (FLUSH) 0.9 %
3 SYRINGE (ML) INJECTION EVERY 8 HOURS
Status: DISCONTINUED | OUTPATIENT
Start: 2025-05-14 | End: 2025-05-14 | Stop reason: HOSPADM

## 2025-05-14 RX ORDER — LIDOCAINE HYDROCHLORIDE 20 MG/ML
INJECTION INTRAVENOUS
Status: DISCONTINUED | OUTPATIENT
Start: 2025-05-14 | End: 2025-05-14

## 2025-05-14 RX ORDER — CYCLOBENZAPRINE HCL 10 MG
10 TABLET ORAL 2 TIMES DAILY PRN
Status: DISCONTINUED | OUTPATIENT
Start: 2025-05-14 | End: 2025-05-15 | Stop reason: HOSPADM

## 2025-05-14 RX ORDER — ROCURONIUM BROMIDE 10 MG/ML
INJECTION, SOLUTION INTRAVENOUS
Status: DISCONTINUED | OUTPATIENT
Start: 2025-05-14 | End: 2025-05-14

## 2025-05-14 RX ORDER — ZOLPIDEM TARTRATE 5 MG/1
5 TABLET ORAL NIGHTLY PRN
Status: DISCONTINUED | OUTPATIENT
Start: 2025-05-14 | End: 2025-05-15 | Stop reason: HOSPADM

## 2025-05-14 RX ORDER — OXYCODONE HYDROCHLORIDE 5 MG/1
5 TABLET ORAL
Status: DISCONTINUED | OUTPATIENT
Start: 2025-05-14 | End: 2025-05-15 | Stop reason: HOSPADM

## 2025-05-14 RX ORDER — HYDROMORPHONE HYDROCHLORIDE 2 MG/ML
0.5 INJECTION, SOLUTION INTRAMUSCULAR; INTRAVENOUS; SUBCUTANEOUS EVERY 5 MIN PRN
Status: DISCONTINUED | OUTPATIENT
Start: 2025-05-14 | End: 2025-05-14 | Stop reason: HOSPADM

## 2025-05-14 RX ORDER — CEFAZOLIN 2 G/1
2 INJECTION, POWDER, FOR SOLUTION INTRAMUSCULAR; INTRAVENOUS
Status: COMPLETED | OUTPATIENT
Start: 2025-05-14 | End: 2025-05-15

## 2025-05-14 RX ORDER — PHENYLEPHRINE HYDROCHLORIDE 10 MG/ML
INJECTION INTRAVENOUS
Status: DISCONTINUED | OUTPATIENT
Start: 2025-05-14 | End: 2025-05-14

## 2025-05-14 RX ORDER — POLYETHYLENE GLYCOL 3350 17 G/17G
17 POWDER, FOR SOLUTION ORAL DAILY
Status: DISCONTINUED | OUTPATIENT
Start: 2025-05-15 | End: 2025-05-15 | Stop reason: HOSPADM

## 2025-05-14 RX ORDER — NAPROXEN SODIUM 220 MG/1
81 TABLET, FILM COATED ORAL DAILY
Status: DISCONTINUED | OUTPATIENT
Start: 2025-05-15 | End: 2025-05-15 | Stop reason: HOSPADM

## 2025-05-14 RX ORDER — CETIRIZINE HYDROCHLORIDE 10 MG/1
10 TABLET ORAL NIGHTLY
Status: DISCONTINUED | OUTPATIENT
Start: 2025-05-14 | End: 2025-05-15 | Stop reason: HOSPADM

## 2025-05-14 RX ORDER — HYDROMORPHONE HYDROCHLORIDE 2 MG/ML
INJECTION, SOLUTION INTRAMUSCULAR; INTRAVENOUS; SUBCUTANEOUS
Status: DISCONTINUED | OUTPATIENT
Start: 2025-05-14 | End: 2025-05-14

## 2025-05-14 RX ORDER — PROPOFOL 10 MG/ML
VIAL (ML) INTRAVENOUS
Status: DISCONTINUED | OUTPATIENT
Start: 2025-05-14 | End: 2025-05-14

## 2025-05-14 RX ORDER — FENTANYL CITRATE 50 UG/ML
INJECTION, SOLUTION INTRAMUSCULAR; INTRAVENOUS
Status: DISCONTINUED | OUTPATIENT
Start: 2025-05-14 | End: 2025-05-14

## 2025-05-14 RX ORDER — MUPIROCIN 20 MG/G
OINTMENT TOPICAL
Status: DISCONTINUED | OUTPATIENT
Start: 2025-05-14 | End: 2025-05-14 | Stop reason: HOSPADM

## 2025-05-14 RX ORDER — QUETIAPINE FUMARATE 25 MG/1
25 TABLET, FILM COATED ORAL NIGHTLY
Status: DISCONTINUED | OUTPATIENT
Start: 2025-05-14 | End: 2025-05-15 | Stop reason: HOSPADM

## 2025-05-14 RX ORDER — ACETAMINOPHEN 500 MG
1000 TABLET ORAL
Status: COMPLETED | OUTPATIENT
Start: 2025-05-14 | End: 2025-05-14

## 2025-05-14 RX ORDER — MIDAZOLAM HYDROCHLORIDE 1 MG/ML
INJECTION INTRAMUSCULAR; INTRAVENOUS
Status: DISCONTINUED | OUTPATIENT
Start: 2025-05-14 | End: 2025-05-14

## 2025-05-14 RX ORDER — ONDANSETRON HYDROCHLORIDE 2 MG/ML
INJECTION, SOLUTION INTRAMUSCULAR; INTRAVENOUS
Status: DISCONTINUED | OUTPATIENT
Start: 2025-05-14 | End: 2025-05-14

## 2025-05-14 RX ORDER — PANTOPRAZOLE SODIUM 40 MG/1
40 TABLET, DELAYED RELEASE ORAL DAILY
Status: DISCONTINUED | OUTPATIENT
Start: 2025-05-15 | End: 2025-05-15 | Stop reason: HOSPADM

## 2025-05-14 RX ORDER — OXYCODONE HYDROCHLORIDE 10 MG/1
10 TABLET ORAL
Status: DISCONTINUED | OUTPATIENT
Start: 2025-05-14 | End: 2025-05-15 | Stop reason: HOSPADM

## 2025-05-14 RX ORDER — MONTELUKAST SODIUM 10 MG/1
10 TABLET ORAL DAILY
Status: DISCONTINUED | OUTPATIENT
Start: 2025-05-15 | End: 2025-05-15 | Stop reason: HOSPADM

## 2025-05-14 RX ORDER — BISACODYL 10 MG/1
10 SUPPOSITORY RECTAL DAILY PRN
Status: DISCONTINUED | OUTPATIENT
Start: 2025-05-14 | End: 2025-05-15 | Stop reason: HOSPADM

## 2025-05-14 RX ORDER — ONDANSETRON HYDROCHLORIDE 2 MG/ML
4 INJECTION, SOLUTION INTRAVENOUS EVERY 6 HOURS PRN
Status: DISCONTINUED | OUTPATIENT
Start: 2025-05-14 | End: 2025-05-15 | Stop reason: HOSPADM

## 2025-05-14 RX ORDER — TRANEXAMIC ACID 1 G/10ML
INJECTION, SOLUTION INTRAVENOUS
Status: DISCONTINUED | OUTPATIENT
Start: 2025-05-14 | End: 2025-05-14 | Stop reason: HOSPADM

## 2025-05-14 RX ORDER — DEXAMETHASONE SODIUM PHOSPHATE 4 MG/ML
INJECTION, SOLUTION INTRA-ARTICULAR; INTRALESIONAL; INTRAMUSCULAR; INTRAVENOUS; SOFT TISSUE
Status: DISCONTINUED | OUTPATIENT
Start: 2025-05-14 | End: 2025-05-14

## 2025-05-14 RX ORDER — TRANEXAMIC ACID 10 MG/ML
1000 INJECTION, SOLUTION INTRAVENOUS
Status: COMPLETED | OUTPATIENT
Start: 2025-05-14 | End: 2025-05-14

## 2025-05-14 RX ADMIN — LIDOCAINE HYDROCHLORIDE 100 MG: 20 INJECTION, SOLUTION INTRAVENOUS at 10:05

## 2025-05-14 RX ADMIN — SODIUM CHLORIDE, SODIUM LACTATE, POTASSIUM CHLORIDE, AND CALCIUM CHLORIDE: .6; .31; .03; .02 INJECTION, SOLUTION INTRAVENOUS at 12:05

## 2025-05-14 RX ADMIN — CLONAZEPAM 1 MG: 0.5 TABLET ORAL at 09:05

## 2025-05-14 RX ADMIN — SODIUM CHLORIDE: 0.9 INJECTION, SOLUTION INTRAVENOUS at 06:05

## 2025-05-14 RX ADMIN — HYDROMORPHONE HYDROCHLORIDE 0.5 MG: 2 INJECTION INTRAMUSCULAR; INTRAVENOUS; SUBCUTANEOUS at 12:05

## 2025-05-14 RX ADMIN — QUETIAPINE FUMARATE 25 MG: 25 TABLET ORAL at 09:05

## 2025-05-14 RX ADMIN — CETIRIZINE HYDROCHLORIDE 10 MG: 10 TABLET, FILM COATED ORAL at 09:05

## 2025-05-14 RX ADMIN — ROCURONIUM BROMIDE 50 MG: 10 INJECTION, SOLUTION INTRAVENOUS at 10:05

## 2025-05-14 RX ADMIN — PHENYLEPHRINE HYDROCHLORIDE 50 MCG: 10 INJECTION INTRAVENOUS at 11:05

## 2025-05-14 RX ADMIN — OXYCODONE HYDROCHLORIDE 10 MG: 10 TABLET ORAL at 04:05

## 2025-05-14 RX ADMIN — SODIUM CHLORIDE, SODIUM LACTATE, POTASSIUM CHLORIDE, AND CALCIUM CHLORIDE: .6; .31; .03; .02 INJECTION, SOLUTION INTRAVENOUS at 10:05

## 2025-05-14 RX ADMIN — OXYCODONE HYDROCHLORIDE 10 MG: 10 TABLET ORAL at 01:05

## 2025-05-14 RX ADMIN — PROPOFOL 30 MG: 10 INJECTION, EMULSION INTRAVENOUS at 10:05

## 2025-05-14 RX ADMIN — ACETAMINOPHEN 1000 MG: 500 TABLET ORAL at 09:05

## 2025-05-14 RX ADMIN — HYDROMORPHONE HYDROCHLORIDE 0.5 MG: 2 INJECTION, SOLUTION INTRAMUSCULAR; INTRAVENOUS; SUBCUTANEOUS at 01:05

## 2025-05-14 RX ADMIN — NAPROXEN 500 MG: 500 TABLET ORAL at 09:05

## 2025-05-14 RX ADMIN — ONDANSETRON 4 MG: 2 INJECTION INTRAMUSCULAR; INTRAVENOUS at 10:05

## 2025-05-14 RX ADMIN — CYCLOBENZAPRINE 10 MG: 10 TABLET, FILM COATED ORAL at 09:05

## 2025-05-14 RX ADMIN — MUPIROCIN: 20 OINTMENT TOPICAL at 09:05

## 2025-05-14 RX ADMIN — FENTANYL CITRATE 100 MCG: 0.05 INJECTION, SOLUTION INTRAMUSCULAR; INTRAVENOUS at 10:05

## 2025-05-14 RX ADMIN — PROPOFOL 150 MG: 10 INJECTION, EMULSION INTRAVENOUS at 10:05

## 2025-05-14 RX ADMIN — PHENYLEPHRINE HYDROCHLORIDE 50 MCG: 10 INJECTION INTRAVENOUS at 10:05

## 2025-05-14 RX ADMIN — HYDROMORPHONE HYDROCHLORIDE 0.5 MG: 2 INJECTION, SOLUTION INTRAMUSCULAR; INTRAVENOUS; SUBCUTANEOUS at 02:05

## 2025-05-14 RX ADMIN — SENNOSIDES AND DOCUSATE SODIUM 1 TABLET: 50; 8.6 TABLET ORAL at 09:05

## 2025-05-14 RX ADMIN — MIDAZOLAM HYDROCHLORIDE 2 MG: 1 INJECTION, SOLUTION INTRAMUSCULAR; INTRAVENOUS at 10:05

## 2025-05-14 RX ADMIN — ONDANSETRON 4 MG: 2 INJECTION INTRAMUSCULAR; INTRAVENOUS at 12:05

## 2025-05-14 RX ADMIN — TRANEXAMIC ACID 1000 MG: 10 INJECTION, SOLUTION INTRAVENOUS at 10:05

## 2025-05-14 RX ADMIN — CEFAZOLIN 2 G: 2 INJECTION, POWDER, FOR SOLUTION INTRAMUSCULAR; INTRAVENOUS at 10:05

## 2025-05-14 RX ADMIN — DEXAMETHASONE SODIUM PHOSPHATE 8 MG: 4 INJECTION, SOLUTION INTRA-ARTICULAR; INTRALESIONAL; INTRAMUSCULAR; INTRAVENOUS; SOFT TISSUE at 10:05

## 2025-05-14 RX ADMIN — SUGAMMADEX 200 MG: 100 INJECTION, SOLUTION INTRAVENOUS at 01:05

## 2025-05-14 RX ADMIN — OXYCODONE HYDROCHLORIDE 10 MG: 10 TABLET ORAL at 07:05

## 2025-05-14 RX ADMIN — CALCIUM CARBONATE (ANTACID) CHEW TAB 500 MG 500 MG: 500 CHEW TAB at 09:05

## 2025-05-14 RX ADMIN — CEFAZOLIN 2 G: 2 INJECTION, POWDER, FOR SOLUTION INTRAMUSCULAR; INTRAVENOUS at 06:05

## 2025-05-14 NOTE — PLAN OF CARE
6606 Report given to Ruchi YBARRA    PACU d/c criteria met. Patient transported to room 502 via stretcher with nurse and transport staff. Patient AAO x 4. Right hip with aquacel dressing, scant serosanguineous drainage. 20 g IV left hand infusing.     1750 Regina RN at bedside to visualize patient is stable.

## 2025-05-14 NOTE — PT/OT/SLP EVAL
Physical Therapy Evaluation and Treatment    Patient Name: Diana Sesay   MRN: [id number]  Recent Surgery: Procedure(s) (LRB):  REVISION, ARTHROPLASTY, HIP, ACETABULAR COMPONENT (Right) * Day of Surgery *    Recommendations:     Discharge Recommendations:  (TBD)   Discharge Equipment Recommendations: none   Barriers to discharge: Ongoing medical treatment    Assessment:     Diana Sesay is a 55 y.o. female admitted with a medical diagnosis of History of right hip replacement. She presents with the following impairments/functional limitations: weakness, gait instability, impaired balance, impaired endurance, impaired self care skills, impaired functional mobility, pain, decreased lower extremity function, decreased ROM, impaired skin, edema, orthopedic precautions. Pt evaluated post R hip revision sx. Pt reporting 9/10 pain, however, agreeable to participate in therapy. Pt was able to stand and take ~3 steps forward/backward/laterally with RW and Carmen. Increased assist for RLE management during bed mobility. Pt is to stay overnight for further recovery and therapy. Will continue to progress pt.     Rehab Prognosis: Good; patient would benefit from acute PT services to address these deficits and reach maximum level of function.    Plan:     During this hospitalization, patient to be seen BID to address the above listed problems via gait training, therapeutic activities, therapeutic exercises    Plan of Care Expires: 06/14/25    Subjective     Chief Complaint: R hip post-op pain  Patient Comments/Goals: pt reports pain as burning near incision, pt agreeable to therapy  Pain/Comfort:  Pain Rating 1: 9/10  Location - Side 1: Right  Location - Orientation 1: generalized  Location 1: hip  Pain Addressed 1: Pre-medicate for activity, Reposition, Distraction, Cessation of Activity, Nurse notified  Pain Rating Post-Intervention 1: 9/10    Social History:  Living Environment: Patient lives with their significant  other, son's significant other, and son in a single story home with ramped and tub-shower combo  Prior Level of Function: Prior to admission, patient was independent and driving, not working, and disabled  Equipment Used at Home: walker, rolling, bedside commode, shower chair  DME owned (not currently used): rolling walker and bedside commode  Assistance Upon Discharge: family    Objective:     Communicated with nsg prior to session. Patient found HOB elevated with telemetry, blood pressure cuff, peripheral IV, pulse ox (continuous), hip abduction pillow upon PT entry to room.    General Precautions: Standard, fall   Orthopedic Precautions: RLE weight bearing as tolerated, RLE posterior precautions   Braces: N/A    Respiratory Status: Room air    Exams:  RLE ROM: Deficits: R hip/knee limited by recent sx and significant pain, ankle WFL  RLE Strength: Deficits: knee/hip NT, pt able to extend R knee against gravity, ankle WFL  LLE ROM: WFL  LLE Strength: WFL  Cognitive: Patient is oriented to Person, Place, Time, Situation  Postural Exam: Patient presented with the following abnormalities:    -       No postural abnormalities identified  Sensation:    -       Intact  Skin Integrity/Edema:     -       Skin integrity: Wound surgical R hip with bandage intact and scant drainage, mild R hip edema    Functional Mobility:  Gait belt applied - Yes  Bed Mobility  Scooting: stand by assistance  Supine to Sit: moderate assistance for LE management  Sit to Supine: moderate assistance for LE management  Transfers  Sit to Stand: minimum assistance with rolling walker and with cues for hand placement, foot placement, and weight bearing precautions  Gait  Patient ambulated 3 steps forward/backward/laterally with rolling walker and minimum assistance. Patient required cues for position in walker, sequencing, upright posture, and weight bearing status to increase independence and safety. Patient required cues ~ 75% of the time. pt  educated on 3 pt gait sequencing and proper use of RW with good/fair+ carryover; VC's for upright posture and appropriate proximity to RW, assist for RW management     Balance  Sitting: GOOD: Maintains balance through MODERATE excursions of active trunk movement  Standing: POOR+: Needs MIN (minimal ) assist during gait      Therapeutic Activities and Exercises:  Patient educated on role of acute care PT and PT POC, safety while in hospital including calling nurse for mobility, call light usage, and posterior hip precautions and WBAT RLE.  Educated about weightbearing precautions and provided cuing for adherence as appropriate during session.  Patient performed 1 set(s) of 10 repetitions of the following bed level exercises: ankle pumps and seated exercises: ankle pumps for bilateral LE. Patient required skilled PT for instruction of exercises and appropriate cues to perform exercises safely and appropriately.  Pt educated on importance of use of RW at all times until advised otherwise by medical professional / physical therapist  Facilitated standing ~2-3 mins and steps forward/backward/lateral as above with Billy  Pt returned supine and abduction pillow replaced   The patient was advised on safe application of ice or cold packs with barrier in between skin and ice intermittently as needed for pain and edema reduction. Ice pack applied to R hip      AM-PAC 6 CLICK MOBILITY  Total Score:14    Patient left HOB elevated with all lines intact, call button in reach, RN notified, and RN present.    GOALS:   Multidisciplinary Problems       Physical Therapy Goals          Problem: Physical Therapy    Goal Priority Disciplines Outcome Interventions   Physical Therapy Goal     PT, PT/OT Progressing    Description: Goals to be met by: 25     Patient will increase functional independence with mobility by performin. Supine to sit with Stand-by Assistance  2. Sit to supine with Stand-by Assistance  3. Sit to stand  transfer with Stand-by Assistance  and maintaining weight-bearing precaution(s) using Rolling Walker  4. Bed to chair transfer with Stand-by Assistance and maintaining weight-bearing precaution(s) using Rolling Walker  5. Gait  x 50 feet with Stand-by Assistance and maintaining weight-bearing precaution(s) using Rolling Walker.                          DME Justifications:  No DME recommended requiring DME justifications    History:     Past Medical History:   Diagnosis Date    Anxiety disorder, unspecified        History reviewed. No pertinent surgical history.    Time Tracking:     PT Received On: 05/14/25  PT Start Time: 1606  PT Stop Time: 1631  PT Total Time (min): 25 min with OT    Billable Minutes: Evaluation 10 and Therapeutic Activity 15    5/14/2025

## 2025-05-14 NOTE — ANESTHESIA PROCEDURE NOTES
Intubation    Date/Time: 5/14/2025 10:29 AM    Performed by: Georgia Hanna CRNA  Authorized by: Bennie Ugalde MD    Intubation:     Induction:  Intravenous    Intubated:  Postinduction    Mask Ventilation:  Easy mask    Attempts:  1    Attempted By:  Student (LISA Zhong, ALEXANDRO)    Method of Intubation:  Video laryngoscopy    Blade:  Rodas 3    Laryngeal View Grade: Grade IIA - cords partially seen      Difficult Airway Encountered?: No      Complications:  None    Airway Device:  Oral endotracheal tube    Airway Device Size:  7.0    Style/Cuff Inflation:  Cuffed (inflated to minimal occlusive pressure)    Tube secured:  21    Secured at:  The lips    Placement Verified By:  Capnometry    Complicating Factors:  Anterior larynx and small mouth    Findings Post-Intubation:  BS equal bilateral and atraumatic/condition of teeth unchanged

## 2025-05-14 NOTE — PROGRESS NOTES
Virtual nurse role explained to patient virtually via [employer]. Demographics, preferred pharmacy and patient portal status reviewed. Required admission documentation reviewed-and completed as necessary.  Plan of care reviewed and discussed. Diagnosis specific CPG added to Care Plan template-if applicable. Active listening utilized addressing any concerns. All questions answered

## 2025-05-14 NOTE — PT/OT/SLP EVAL
Occupational Therapy   Evaluation and treatment    Name: Diana Sesay  MRN: [id number]  Admitting Diagnosis: History of right hip replacement  Recent Surgery: Procedure(s) (LRB):  REVISION, ARTHROPLASTY, HIP, ACETABULAR COMPONENT (Right) * Day of Surgery *    Recommendations:     Discharge Recommendations:  (tbd ongoing assessment; not functionally safe to discharge home)  Discharge Equipment Recommendations:   (recommend hip kit (patient indicates however that she will have family assist and will not need device; further education needed))  Barriers to discharge:  Other (Comment) (pain; limited function)    Assessment:     Diana Sesay is a 55 y.o. female with a medical diagnosis of History of right hip replacement.  She presents with ..The encounter diagnosis was Chronic hip pain after total replacement of right hip joint.    . Performance deficits affecting function: weakness, impaired endurance, impaired self care skills, impaired functional mobility, decreased lower extremity function, gait instability, impaired balance, pain, orthopedic precautions.      OT eval completed in recovery, coeval with PT post op day 0 R hip revision surgery. On evaluation this date patient mildly apprehensive, pain at 9/10 to R hip but agreeable for initial evaluation, and able to take a few steps forward/ laterally with minimal assist with rolling walker and pre- LB dressing training. Will continue to follow. Patient is staying overnight, further assessment next date.        Rehab Prognosis: Good; patient would benefit from acute skilled OT services to address these deficits and reach maximum level of function.       Plan:     Patient to be seen 5 x/week to address the above listed problems via self-care/home management, therapeutic activities, therapeutic exercises  Plan of Care Expires: 06/04/25  Plan of Care Reviewed with: patient    Subjective     Chief Complaint: post op discomfort in R hip  Patient/Family  Comments/goals: reports agreeable to try to stand, indicates mild burning in R hip, reports feeling glad she was able to stand and take a few steps but indicates had enough and needing to return to lying    Occupational Profile:  Living Environment: resides with significant other, son, and son's girlfriend in a single story home, ramp entrance with B handrails, tub shower  Previous level of function: independent; driving; reports on disability and not working; reports first hip surgery was 16 years ago  Equipment Used at Home: walker, rolling, shower chair, bedside commode  Assistance upon Discharge: family    Pain/Comfort:  Pain Rating 1: 9/10 (R hip)  Pain Addressed 1: Pre-medicate for activity, Distraction, Cessation of Activity, Reposition  Pain Rating Post-Intervention 1: 9/10 (R hip)      Objective:     Communicated with: nursing prior to session.  Patient found HOB elevated with blood pressure cuff, hip abduction pillow, pulse ox (continuous) upon OT entry to room.    General Precautions: Standard, fall  Orthopedic Precautions: RLE posterior precautions, RLE weight bearing as tolerated  Braces: N/A  Respiratory Status: Room air    Occupational Performance:    Bed Mobility:    Patient completed Supine to Sit with moderate assist, increased time  Patient completed Sit to Supine with moderate assist, increased time, minimal verbal cues for positioning    Functional Mobility/Transfers:  Patient completed Sit <> Stand Transfer with minimal assist,   with  rolling walker, minimal verbal cues for technique, hand and RLE placement   Functional Mobility: Minimal forward/ backward/ lateral steps with rolling walker, minimal assist; verbal / visual ed for management of rolling walker, pushing through BUE, and step sequencing pattern.     Activities of Daily Living:  Feeding:  independence    Upper Body Dressing: minimum assistance IV  Lower Body Dressing: not performed/ total assist for management of socks; initiated  education for RLE adaptive sequence     Cognitive/Visual Perceptual:  Cognitive/Psychosocial Skills:     -       Oriented to: Person, Place, Time, and Situation   -       Follows Commands/attention:follows commands with multimodal cues  -       Safety awareness/insight to disability: needs repeat education; recall 1/3 hip precautions at end of session     Physical Exam:  Skin integrity: wound R hip surgical incision dressed with bandage intact; scant drainage in bandage  Upper Extremity Range of Motion:     -       Right Upper Extremity: WFL  -       Left Upper Extremity: WFL  Upper Extremity Strength: -       Right Upper Extremity: grossly WFL  -       Left Upper Extremity: grossly WFL    AMPAC 6 Click ADL:  AMPAC Total Score: 17    Treatment & Education:  Patient educated on role of OT.  Patient educated on hip precautions with reciprocation of 1/3 at end of session.   Patient also educated on positioning such as  correct placement of hip abduction pillow, sleep positioning strategies, and use of ice pack with barrier for pain management.   Bed mobility, sit to stand, functional mobility initial training as above.  Initiated pre- self care/LB dressing training of standing with rolling walker, one UE on walker, reach with other UE to behind back with minimal assist for task practice.  Initiated education on ongoing therapy, use of hip abduction pillow, and need to use rolling walker at all times.  Ice pack applied to R hip.  BP WFL during transitions this date per monitor.      Patient left HOB elevated with all lines intact, call button in reach, and nursing notified    GOALS:   Multidisciplinary Problems       Occupational Therapy Goals          Problem: Occupational Therapy    Goal Priority Disciplines Outcome Interventions   Occupational Therapy Goal     OT, PT/OT Progressing    Description: Goals to be met by: 6/4/2025     Patient will increase functional independence with ADLs by performing:    LE Dressing  with Stand-by Assistance with use of adaptive equipment or with support of care partner while maintaining precautions.  Toileting from toilet with Stand-by Assistance for hygiene and clothing management.   Patient will complete functional mobility to / from bathroom inclusive of Toilet transfer to toilet with Stand-by Assistance with use of rolling walker.  100% reciprocation of 3/3 hip precautions, DME recommendations, and ADL/task modifications post R hip revision to support safe d/c at highest level of function.                                    History:     Past Medical History:   Diagnosis Date    Anxiety disorder, unspecified        History reviewed. No pertinent surgical history.    Time Tracking:     OT Date of Treatment: 05/14/25  OT Start Time: 1605  OT Stop Time: 1630  OT Total Time (min): 25 min total time; co heidi PT    Billable Minutes:Evaluation 15 min    5/14/2025

## 2025-05-14 NOTE — ANESTHESIA POSTPROCEDURE EVALUATION
Anesthesia Post Evaluation    Patient: Diana Sesay    Procedure(s) Performed: Procedure(s) (LRB):  REVISION, ARTHROPLASTY, HIP, ACETABULAR COMPONENT (Right)    Final Anesthesia Type: general      Patient location during evaluation: PACU  Patient participation: Yes- Able to Participate  Level of consciousness: awake and alert, oriented and awake  Post-procedure vital signs: reviewed and stable  Pain management: adequate  Airway patency: patent  LEORA mitigation strategies: Multimodal analgesia  PONV status at discharge: No PONV  Anesthetic complications: no      Cardiovascular status: blood pressure returned to baseline and hemodynamically stable  Respiratory status: unassisted and spontaneous ventilation  Hydration status: euvolemic  Follow-up not needed.              Vitals Value Taken Time   /59 05/14/25 14:01   Temp 36.4 °C (97.5 °F) 05/14/25 13:30   Pulse 88 05/14/25 14:03   Resp 11 05/14/25 14:03   SpO2 96 % 05/14/25 14:03   Vitals shown include unfiled device data.      No case tracking events are documented in the log.      Pain/Dina Score: Pain Rating Prior to Med Admin: 8 (5/14/2025  1:54 PM)  Dina Score: 10 (5/14/2025  1:55 PM)

## 2025-05-14 NOTE — OP NOTE
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    DATE OF SURGERY:  May 14, 2025    NAME OF PROCEDURE:  Revision right acetabulum  SURGEON: Tung Dean MD.    ASSISTANT: MD Gail    IMPLANT SYSTEM:  Exactech XLE liner    EBL:  250 cc.    DESCRIPTION OF PROCEDURE: The patient was taken to the Operating Room and given general anesthesia. Intravenous antibiotics were given. The patient was placed in the lateral decubitus position with an axillary roll in place. The limbs were carefully padded and supported. The hip was prepped and draped in the usual sterile fashion. A posterolateral skin incision, incorporating the distal part of the previous incision, was made. Sharp dissection was carried down to the fascia shai. The fascia shai and gluteus nicholas were incised The trochanteric bursa was excised. The abductor mechanism was retracted anteriorly. An incision was made between the piriformis and the gluteus minimus starting at the rim of the acetabulum, extending distally to the intertrochanteric ridge and then distally to the lesser trochanter creating a full-thickness posterior capsular/tendinous flap. This flap was retracted posteriorly, and the hip was dislocated.     The femoral head was removed.  The stem was well fixed without evidence of corrosion or damage to the neck.  The femur was retracted anteriorly.  The acetabular rim was debrided, defining the margins of the acetabular shell and columns.  There was minimal clear fluid.  There was a small amount of bland membrane.  There was no metallosis.  The acetabular liner was removed by driving a screw through it.  There was wear in the superior part of the polyethylene that was not full-thickness.  There was no visible damage to the socket.  Fixation of the socket was stressed.  There was no visible movement whatsoever.  The remainder of the membrane was removed  with curette and rongeur.  At this point various trial acetabular liners were used.  I determined at this point that the acetabular shell was so old that the liners supplied by the manufacture, specifically for this case, did not in fact lock.  After considering various options including waiting several hours for other liners, using a liner from another company and cementing a conventional liner into the socket, I determined that the last option was preferable.  The acetabular shell was irrigated with pulse lavage and dried.  Gentamicin cement was mixed.  The size G3 liner was then cemented into position with the lip in a posterior, inferior location.  Inclination and version were matched to the acetabular shell which was radiographically in excellent position.  After the cement had cured, a careful search for extra cement was made in all was removed.  Trial reduction revealed best restoration of leg length, range of motion and stability with the +0 head.  The neck was cleaned and dried.  The ceramic +0 head was seated with a dedicated sleeve in Situ.  The socket was irrigated again.  Hip was reduced.  Range range of motion stability were excellent this point.    The capsular layer was closed with 1. Vicryl.  The fascia shai was closed with Stratafix, the subcutaneous tissue was closed with 2-0 Vicryl and the skin edge was closed with Monocryl and Prineo.    The estimated blood loss was 250 cc.  There were no known complications.  I was present for the entire procedure    Voice recognition was used for this transcription.  Despite proofreading, transcription errors may persist.

## 2025-05-14 NOTE — TRANSFER OF CARE
"Anesthesia Transfer of Care Note    Patient: Diana Sesay    Procedure(s) Performed: Procedure(s) (LRB):  REVISION, ARTHROPLASTY, HIP, ACETABULAR COMPONENT (Right)    Patient location: PACU    Anesthesia Type: general    Transport from OR: Transported from OR on 6-10 L/min O2 by face mask with adequate spontaneous ventilation    Post pain: adequate analgesia    Post assessment: no apparent anesthetic complications    Post vital signs: stable    Level of consciousness: awake and alert    Nausea/Vomiting: no nausea/vomiting    Complications: none    Transfer of care protocol was followed      Last vitals: Visit Vitals  BP (!) 119/57 (BP Location: Right arm, Patient Position: Lying)   Pulse 89   Temp 36.4 °C (97.5 °F) (Temporal)   Resp 17   Ht 5' 7" (1.702 m)   Wt 64 kg (141 lb 1.5 oz)   SpO2 100%   Breastfeeding No   BMI 22.10 kg/m²     "

## 2025-05-14 NOTE — PLAN OF CARE
CRYSTAL gordon completed in recovery, coeval with PT post op day 0 R hip revision surgery. Patient with PLOF of independence, no recent falls, drives and has family support as needed. On evaluation this date patient mildly apprehensive, pain at 9/10 to R hip but agreeable for initial evaluation, and able to take a few steps forward/ laterally with minimal assist with rolling walker and pre- LB dressing training. Will continue to follow. Patient is staying overnight, further assessment next date.    Problem: Occupational Therapy  Goal: Occupational Therapy Goal  Description: Goals to be met by: 6/4/2025     Patient will increase functional independence with ADLs by performing:    LE Dressing with Stand-by Assistance with use of adaptive equipment or with support of care partner while maintaining precautions.  Toileting from toilet with Stand-by Assistance for hygiene and clothing management.   Patient will complete functional mobility to / from bathroom inclusive of Toilet transfer to toilet with Stand-by Assistance with use of rolling walker.  100% reciprocation of 3/3 hip precautions, DME recommendations, and ADL/task modifications post R hip revision to support safe d/c at highest level of function.           Outcome: Progressing

## 2025-05-14 NOTE — PLAN OF CARE
Problem: Physical Therapy  Goal: Physical Therapy Goal  Description: Goals to be met by: 25     Patient will increase functional independence with mobility by performin. Supine to sit with Stand-by Assistance  2. Sit to supine with Stand-by Assistance  3. Sit to stand transfer with Stand-by Assistance  and maintaining weight-bearing precaution(s) using Rolling Walker  4. Bed to chair transfer with Stand-by Assistance and maintaining weight-bearing precaution(s) using Rolling Walker  5. Gait  x 50 feet with Stand-by Assistance and maintaining weight-bearing precaution(s) using Rolling Walker.     Outcome: Progressing     Pt evaluated post R hip revision sx. Pt reporting 9/10 pain, however, agreeable to participate in therapy. Pt was able to stand and take ~3 steps forward/backward/laterally with RW and Carmen. Increased assist for RLE management during bed mobility. Pt is to stay overnight for further recovery and therapy. Will continue to progress pt.

## 2025-05-15 VITALS
TEMPERATURE: 98 F | BODY MASS INDEX: 22.7 KG/M2 | WEIGHT: 144.63 LBS | HEIGHT: 67 IN | HEART RATE: 88 BPM | DIASTOLIC BLOOD PRESSURE: 52 MMHG | RESPIRATION RATE: 16 BRPM | SYSTOLIC BLOOD PRESSURE: 102 MMHG | OXYGEN SATURATION: 97 %

## 2025-05-15 LAB
ANION GAP (OHS): 7 MMOL/L (ref 8–16)
BUN SERPL-MCNC: 16 MG/DL (ref 6–20)
CALCIUM SERPL-MCNC: 9.2 MG/DL (ref 8.7–10.5)
CHLORIDE SERPL-SCNC: 105 MMOL/L (ref 95–110)
CO2 SERPL-SCNC: 27 MMOL/L (ref 23–29)
CREAT SERPL-MCNC: 0.7 MG/DL (ref 0.5–1.4)
GFR SERPLBLD CREATININE-BSD FMLA CKD-EPI: >60 ML/MIN/1.73/M2
GLUCOSE SERPL-MCNC: 105 MG/DL (ref 70–110)
HCT VFR BLD AUTO: 32.4 % (ref 37–48.5)
HGB BLD-MCNC: 10.6 GM/DL (ref 12–16)
POTASSIUM SERPL-SCNC: 3.9 MMOL/L (ref 3.5–5.1)
SODIUM SERPL-SCNC: 139 MMOL/L (ref 136–145)

## 2025-05-15 PROCEDURE — 36415 COLL VENOUS BLD VENIPUNCTURE: CPT | Performed by: ORTHOPAEDIC SURGERY

## 2025-05-15 PROCEDURE — 97530 THERAPEUTIC ACTIVITIES: CPT

## 2025-05-15 PROCEDURE — 94761 N-INVAS EAR/PLS OXIMETRY MLT: CPT

## 2025-05-15 PROCEDURE — 63600175 PHARM REV CODE 636 W HCPCS: Performed by: ORTHOPAEDIC SURGERY

## 2025-05-15 PROCEDURE — 97110 THERAPEUTIC EXERCISES: CPT

## 2025-05-15 PROCEDURE — 25000003 PHARM REV CODE 250: Performed by: ORTHOPAEDIC SURGERY

## 2025-05-15 PROCEDURE — 85018 HEMOGLOBIN: CPT | Performed by: ORTHOPAEDIC SURGERY

## 2025-05-15 PROCEDURE — 97535 SELF CARE MNGMENT TRAINING: CPT

## 2025-05-15 PROCEDURE — 97116 GAIT TRAINING THERAPY: CPT

## 2025-05-15 PROCEDURE — 85014 HEMATOCRIT: CPT | Performed by: ORTHOPAEDIC SURGERY

## 2025-05-15 PROCEDURE — 80048 BASIC METABOLIC PNL TOTAL CA: CPT | Performed by: ORTHOPAEDIC SURGERY

## 2025-05-15 RX ORDER — NAPROXEN SODIUM 220 MG/1
81 TABLET, FILM COATED ORAL DAILY
Start: 2025-05-15 | End: 2025-06-26

## 2025-05-15 RX ADMIN — OXYCODONE 5 MG: 5 TABLET ORAL at 10:05

## 2025-05-15 RX ADMIN — ACETAMINOPHEN 1000 MG: 500 TABLET ORAL at 09:05

## 2025-05-15 RX ADMIN — OXYCODONE HYDROCHLORIDE 10 MG: 10 TABLET ORAL at 12:05

## 2025-05-15 RX ADMIN — MONTELUKAST 10 MG: 10 TABLET, FILM COATED ORAL at 09:05

## 2025-05-15 RX ADMIN — SENNOSIDES AND DOCUSATE SODIUM 1 TABLET: 50; 8.6 TABLET ORAL at 09:05

## 2025-05-15 RX ADMIN — PANTOPRAZOLE SODIUM 40 MG: 40 TABLET, DELAYED RELEASE ORAL at 09:05

## 2025-05-15 RX ADMIN — CEFAZOLIN 2 G: 2 INJECTION, POWDER, FOR SOLUTION INTRAMUSCULAR; INTRAVENOUS at 02:05

## 2025-05-15 RX ADMIN — ASPIRIN 81 MG CHEWABLE TABLET 81 MG: 81 TABLET CHEWABLE at 09:05

## 2025-05-15 RX ADMIN — POLYETHYLENE GLYCOL 3350 17 G: 17 POWDER, FOR SOLUTION ORAL at 09:05

## 2025-05-15 RX ADMIN — CLONAZEPAM 1 MG: 0.5 TABLET ORAL at 09:05

## 2025-05-15 RX ADMIN — MELOXICAM 15 MG: 7.5 TABLET ORAL at 09:05

## 2025-05-15 RX ADMIN — OXYCODONE HYDROCHLORIDE 10 MG: 10 TABLET ORAL at 01:05

## 2025-05-15 NOTE — PROGRESS NOTES
"[facility] Surg  Orthopedics  Progress Note    Patient Name: Diana Sesay  MRN: [id number]  Admission Date: 5/14/2025  Hospital Length of Stay: 0 days  Attending Provider: Tung Dean MD  Primary Care Provider: Hannah Reddy FNP  Follow-up For: Procedure(s) (LRB):  REVISION, ARTHROPLASTY, HIP, ACETABULAR COMPONENT (Right)    Post-Operative Day: 1 Day Post-Op  Subjective:     Principal Problem:History of right hip replacement    Principal Orthopedic Problem:  Same    Interval History:  The patient reports that she is reasonably comfortable.  No specific complaints reported    Review of patient's allergies indicates:   Allergen Reactions    Sudafed cold-allergy        Current Facility-Administered Medications   Medication    acetaminophen tablet 1,000 mg    aspirin chewable tablet 81 mg    atorvastatin tablet 20 mg    bisacodyL suppository 10 mg    calcium carbonate 200 mg calcium (500 mg) chewable tablet 500 mg    cetirizine tablet 10 mg    clonazePAM tablet 1 mg    cyclobenzaprine tablet 10 mg    HYDROmorphone injection 0.5 mg    meloxicam tablet 15 mg    montelukast tablet 10 mg    ondansetron disintegrating tablet 4 mg    ondansetron injection 4 mg    oxyCODONE immediate release tablet 5 mg    oxyCODONE immediate release tablet Tab 10 mg    pantoprazole EC tablet 40 mg    polyethylene glycol packet 17 g    QUEtiapine tablet 25 mg    senna-docusate 8.6-50 mg per tablet 1 tablet    sodium chloride 0.9% flush 5 mL    zolpidem tablet 5 mg     Objective:     Vital Signs (Most Recent):  Temp: 98.2 °F (36.8 °C) (05/15/25 0721)  Pulse: 90 (05/15/25 0721)  Resp: 18 (05/15/25 0721)  BP: (!) 98/59 (05/15/25 0721)  SpO2: 99 % (05/15/25 0750) Vital Signs (24h Range):  Temp:  [97.3 °F (36.3 °C)-98.2 °F (36.8 °C)] 98.2 °F (36.8 °C)  Pulse:  [] 90  Resp:  [7-24] 18  SpO2:  [92 %-100 %] 99 %  BP: ()/(54-65) 98/59     Weight: 65.6 kg (144 lb 10 oz)  Height: 5' 7" (170.2 cm)  Body mass index is 22.65 " kg/m².      Intake/Output Summary (Last 24 hours) at 5/15/2025 0823  Last data filed at 5/14/2025 1257  Gross per 24 hour   Intake 1100 ml   Output 250 ml   Net 850 ml       Ortho/SPM Exam    Appears alert and comfortable  Minimal spotting on dressing  Right lower extremity neurovascular intact  Postop radiographs are satisfactory    Significant Labs: All pertinent labs within the past 24 hours have been reviewed.    Significant Imaging: I have reviewed all pertinent imaging results/findings.    Assessment/Plan:     Active Diagnoses:    Diagnosis Date Noted POA    PRINCIPAL PROBLEM:  History of right hip replacement [Z96.641] 05/14/2025 Not Applicable      Problems Resolved During this Admission:     Normal postop day 1 after revision hip surgery.  Ambulate with physical therapy  Plan to discharge home when walking safely    Tung Dean MD  Orthopedics  [facility] Surg

## 2025-05-15 NOTE — PT/OT/SLP PROGRESS
Physical Therapy Treatment    Patient Name:  Diana Sesay   MRN:  [id number]    Recommendations:     Discharge Recommendations: Low Intensity Therapy (HH PT/OT and family assistance)  Discharge Equipment Recommendations: none  Barriers to discharge: None    Assessment:     Diana Sesay is a 55 y.o. female admitted with a medical diagnosis of History of right hip replacement.  She presents with the following impairments/functional limitations: weakness, gait instability, impaired balance, impaired endurance, impaired self care skills, impaired functional mobility, orthopedic precautions, impaired skin, edema, pain, decreased lower extremity function, decreased ROM . Pt progressed well and is cleared to d/c home this date.    Rehab Prognosis: Good; patient would benefit from acute skilled PT services to address these deficits and reach maximum level of function.    Recent Surgery: Procedure(s) (LRB):  REVISION, ARTHROPLASTY, HIP, ACETABULAR COMPONENT (Right) 1 Day Post-Op    Plan:     During this hospitalization, patient to be seen BID to address the identified rehab impairments via gait training, therapeutic activities, therapeutic exercises and progress toward the following goals:    Plan of Care Expires:  06/14/25    Subjective     Chief Complaint: post-op R hip pain  Patient/Family Comments/goals: to return home  Pain/Comfort:  Pain Rating 1: 8/10  Location - Side 1: Right  Location - Orientation 1: generalized  Location 1: hip  Pain Addressed 1: Pre-medicate for activity, Distraction, Reposition, Cessation of Activity, Nurse notified  Pain Rating Post-Intervention 1:  (not rated but reports pain is improved with mobility)      Objective:     Communicated with nsg prior to session.  Patient found HOB elevated with peripheral IV upon PT entry to room.     General Precautions: Standard, fall  Orthopedic Precautions: RLE weight bearing as tolerated, RLE posterior precautions  Braces: N/A  Respiratory Status:  Room air     Functional Mobility:  Bed Mobility:     Scooting: minimum assistance for RLE  Supine to Sit: minimum assistance for RLE  Transfers:     Sit to Stand:  stand by assistance and contact guard assistance with rolling walker, verbal and visual cues for hand/foot placement and sequencing for maintaining precautions    Bed to Chair: stand by assistance with  rolling walker  using  Step Transfer  Toilet Transfer: stand by assistance with  rolling walker  using  Step Transfer  Gait: Pt ambulated ~25 ft to restroom and ~50 ft in hallway with RW and SBA, pt educated on 3 pt gait sequencing and proper use of RW with good carryover; VC's for upright posture, neutral RLE position and preventing R hip IR, and appropriate proximity to RW        AM-PAC 6 CLICK MOBILITY  Turning over in bed (including adjusting bedclothes, sheets and blankets)?: 3  Sitting down on and standing up from a chair with arms (e.g., wheelchair, bedside commode, etc.): 3  Moving from lying on back to sitting on the side of the bed?: 3  Moving to and from a bed to a chair (including a wheelchair)?: 3  Need to walk in hospital room?: 3  Climbing 3-5 steps with a railing?: 3  Basic Mobility Total Score: 18       Treatment & Education:  Pt agreeable to therapy  Seated therex 10 reps BLE ankle pumps, LAQs and standing weight shifts/marches   Pt educated on ice/rest/elevation, weight-bearing status/precautions, home safety, car/toilet/shower/tub t/fs, and safe use of DME for functional mobility and ADLs. Pt ambulated safely and is cleared to d/c home this date with low intensity therapy (HH PT/OT) and family/caregiver assist as needed.   Provided and educated caregiver on use of gait belt for safe entry into home and for transfers.  Pt mobilized to restroom and in weems as above  Educated pt/significant other on BSC placement over toilet and RLE sequencing  Pt up in chair  BLE reclined  Discussed proper sleep positioning and proper positioning in  recliner chair and emphasis on preventing RLE from rotating inward - encouraged use of pillow or neutral alignment   The patient was advised on safe application of ice or cold packs with barrier in between skin and ice intermittently as needed for pain and edema reduction. Ice pack applied to R hip      Patient left up in chair with all lines intact, call button in reach, chair alarm on, nsg notified, and significant other present..    GOALS:   Multidisciplinary Problems       Physical Therapy Goals          Problem: Physical Therapy    Goal Priority Disciplines Outcome Interventions   Physical Therapy Goal     PT, PT/OT Progressing    Description: Goals to be met by: 25     Patient will increase functional independence with mobility by performin. Supine to sit with Stand-by Assistance  2. Sit to supine with Stand-by Assistance  3. Sit to stand transfer with Stand-by Assistance  and maintaining weight-bearing precaution(s) using Rolling Walker  4. Bed to chair transfer with Stand-by Assistance and maintaining weight-bearing precaution(s) using Rolling Walker  5. Gait  x 50 feet with Stand-by Assistance and maintaining weight-bearing precaution(s) using Rolling Walker.                          DME Justifications:  No DME recommended requiring DME justifications    Time Tracking:     PT Received On: 05/15/25  PT Start Time: 1022     PT Stop Time: 1056  PT Total Time (min): 34 min     Billable Minutes: Gait Training 16, Therapeutic Activity 10, and Therapeutic Exercise 8    Treatment Type: Treatment  PT/PTA: PT     Number of PTA visits since last PT visit: 0     05/15/2025

## 2025-05-15 NOTE — DISCHARGE INSTRUCTIONS
GENERAL DISCHARGE INSTRUCTIONS:        FOLLOW UP APPOINTMENT:  Call your surgeon's office to schedule your post operative appointment, if one has not already been scheduled.     WEIGHTBEARING:  You may bear as much weight as you can tolerate on your operative leg.  Continue to use a walking device until d/c'd by your physical therapist or doctor.    PAIN MANAGEMENT:  Take your pain medication as prescribed.  Wean yourself off narcotic pain medication slowly, supplementing with acetominophen (Tylenol).  Do not exceed 3000 mg of Tylenol per day.  You may also substitute other over-the-counter pain medications unless your allergic or have been instructed not to do so by your doctor.    SURGICAL DRESSING:  If you are discharged on the day of surgery after knee replacement, remove Ace wrap 24 hours after surgery.  Leave underlying, plastic dressing in place.  For all patients:  Do not change plastic dressing unless it falls off or it appears that it is saturated.     It is normal to see some blood spotting on the dressing.  If the dressing appears to leak or be saturated, you may replace it with the dressing that was provided.    Whenever possible, avoid seeking emergency room and urgent care for dressing related questions and problems.  I and my staff are available Monday through Friday for dressing changes.  Please contact the office by phone or through the portal for questions regarding your dressing    DRIVING:  Do not drive until you have your follow up appointment with your surgeon.    SHOWERING:  You can shower as long as your dressing is intact and your physical therapist has instructed you on how to safely get in and out of your shower.    TO PREVENT BLOOD CLOTS: continue to take aspirin (or other medication) as instructed above.  Also continue to walk frequently throughout the day.    TO PREVENT CONSTIPATION:  continue to take stool softeners regularly for as long as you are on narcotic pain medication  (oxycodone/hydrocodone). If you do not have normal bowel movement for 1-2 days, purchase an over the counter laxative, such as Milk of Magnesia, and follow the instructions on the label. Call your doctor for severe abdominal pain, vomiting or diarrhea.    To PREVENT SWELLING:  This is normal for at least 2 months after surgery. Spend time each day with your leg elevated on several pillows. Use ice as needed. HAKAN stockings are helpful for swelling, but MAY be removed, if desired.    NOTIFY YOUR SURGEON IF: Unrelenting pain that does not improve, even after taking prescribed pain medication. Increasing drainage from the wound.

## 2025-05-15 NOTE — PLAN OF CARE
05/15/25 1212   Final Note   Assessment Type Final Discharge Note   Anticipated Discharge Disposition Home   Post-Acute Status   Discharge Delays None known at this time     Pt will dc home today with significant other Willy at the bedside to provide transport. Pt has a RW, BSC, and shower chair at home. Not current with , but ambulatory referral sent during preop to [facility]. Pt is cleared by CM.    4:34pm- Pt was declined by [facility] due to out of service area. Pt is accepted by [facility].  orders faxed to [facility] at [phone].  notified pt of accepted HH agency.    Future Appointments   Date Time Provider Department Center   5/29/2025 10:15 AM [facility] ORTHO CLINIC LIMIT 350LBS [facility] ORTHXR [facility]   5/29/2025 10:30 AM Tung Dean MD [facility] ORTHO [facility]     Ayleen Carrington RN, [facility]  Case Management- [facility]  [phone]

## 2025-05-15 NOTE — PROGRESS NOTES
Discharge orders noted. Additional clinical references attached.    Patient's discharge instructions given by bedside RN and reviewed via this VN with patient.    Education provided on new medication, diagnosis, and follow-up appointments.    All questions answered. Teach back method used. Patient verbalized understanding.    Transport to [facility] requested. Floor nurse notified.      05/15/25 7401   AVS Confirmation   Discharge instructions and AVS provided to and reviewed with patient and/or significant other. Yes

## 2025-05-15 NOTE — DISCHARGE SUMMARY
DISCHARGE SUMMARY      Date and time of Admission: 5/14/2025  8:25 AM    Date of Discharge:5/15/2025    Discharge Diagnoses:    Active Hospital Problems    Diagnosis  POA    *History of right hip replacement [Z96.641]  Not Applicable      Resolved Hospital Problems   No resolved problems to display.       Discharge Medications:       Medication List        START taking these medications      aspirin 81 MG Chew  Take 1 tablet (81 mg total) by mouth once daily.     oxyCODONE-acetaminophen 5-325 mg per tablet  Commonly known as: PERCOCET  Take 1 tablet by mouth every 6 (six) hours as needed for Pain.            CONTINUE taking these medications      atorvastatin 20 MG tablet  Commonly known as: LIPITOR     cetirizine 10 MG tablet  Commonly known as: ZYRTEC  Take 1 tablet (10 mg total) by mouth every evening.     clonazePAM 1 MG tablet  Commonly known as: KlonoPIN     cyclobenzaprine 10 MG tablet  Commonly known as: FLEXERIL     ENBRACE HR 1.5 mg iron- 8.73 mg-6.4 mg capsule  Generic drug: [id number]     loratadine 10 mg tablet  Commonly known as: CLARITIN     meloxicam 15 MG tablet  Commonly known as: MOBIC     montelukast 10 mg tablet  Commonly known as: SINGULAIR     pantoprazole 40 MG tablet  Commonly known as: PROTONIX     QUEtiapine 25 MG Tab  Commonly known as: SEROQUEL            STOP taking these medications      HYDROcodone-acetaminophen 5-325 mg per tablet  Commonly known as: NORCO               Where to Get Your Medications        These medications were sent to [facility]  [address], [place] [address]      Hours: Mon-Fri, 8a-5:30p Phone: [phone]   oxyCODONE-acetaminophen 5-325 mg per tablet       Information about where to get these medications is not yet available    Ask your nurse or doctor about these medications  aspirin 81 MG Chew         Follow-up (including scheduled tests):    History of Present Illness: See H&P    Past Medical History:    Past  Medical History:   Diagnosis Date    Anxiety disorder, unspecified        Allergies: [facility] cold-allergy    [facility] Course:    The patient underwent surgery on the day of admission. The procedure was uneventful and the patient tolerated well. Post operatively the patient was hemodynamically stable and made appropriate progress in therapy. There were no evident acute postoperative complications.    Procedures:  Revision of right acetabular device    Discharge Physical Exam: See progress note    Condition: Improved    Activity: WBAT    Diet: Resume preadmission diet    Disposition: Home with services

## 2025-05-15 NOTE — PT/OT/SLP PROGRESS
Occupational Therapy   Treatment    Name: Diana Sesay  MRN: [id number]  Admitting Diagnosis:  History of right hip replacement  1 Day Post-Op    Recommendations:     Discharge Recommendations: Low Intensity Therapy (HH OT PT ; family assist)  Discharge Equipment Recommendations:  none  Barriers to discharge:  None    Assessment:     Diana Sesay is a 55 y.o. female with a medical diagnosis of History of right hip replacement.  She presents with .The primary encounter diagnosis was History of right hip replacement. A diagnosis of Chronic hip pain after total replacement of right hip joint was also pertinent to this visit.  . Performance deficits affecting function are weakness, impaired endurance, impaired self care skills, impaired functional mobility, decreased lower extremity function, gait instability, impaired balance, pain.     Functionally safe to discharge home with support of significant other. Progressed to functional mobility with rolling walker with SBA and toileting in true bathroom with SBA. Significant other present / participatory and competent in caregiver ed/training for assisting patient in LB dressing while enabling patient to maintain hip precautions (patient and significant other not interested in use of hip kit/adaptive equipment). Recommend low intensity therapy.        Rehab Prognosis:  Good; patient would benefit from acute skilled OT services to address these deficits and reach maximum level of function.       Plan:     Patient to be seen 5 x/week to address the above listed problems via self-care/home management, therapeutic activities, therapeutic exercises  Plan of Care Expires: 06/04/25  Plan of Care Reviewed with: patient, significant other    Subjective     Chief Complaint: mild R hip pain  Patient/Family Comments/goals: feeling good overall, eager to go home; significant other reports competence  Pain/Comfort:  Pain Rating 1: 8/10 (r hip)  Pain Addressed 1: Reposition,  Cessation of Activity, Nurse notified  Pain Rating Post-Intervention 1: other (see comments) (not rated)    Objective:     Communicated with: nursing prior to session.  Patient found ambulating with RN to bathroom with peripheral IV upon OT entry to room.    General Precautions: Standard, fall    Orthopedic Precautions:RLE weight bearing as tolerated, RLE posterior precautions  Braces: N/A  Respiratory Status: Room air     Occupational Performance:         Functional Mobility/Transfers:  Patient completed Sit <> Stand Transfer with SBA  with  rolling walker, minimal safety / verbal education cues from commode   Functional Mobility: return functional mobility from bathroom to bedroom with rolling walker, briefly CGA in turns improves quickly to SBA with minimal cues for 3 step mobility pattern    Activities of Daily Living:  Grooming: independence; standing at sink - supervision, min verbal cues to point rolling walker toward counter  Lower Body Dressing: moderate assistance from significant other with OT providing verbal coaching / cueing to significant other, verbal reminders to patient for hip precautions during activity; not interested in hip kit  Toileting: SBA ; true bathroom      [facility] 6 Click ADL: 19    Treatment & Education:  Patient up in room mobilizing with RN to bathroom, OT takes over. Patient oriented, expressing discomfort to R hip but overall feeling well. Significant other Willy present. Reviewed hip precautions with 100% accuracy on 2nd trial. ADL / functional mobility and caregiver education / training as above. Demonstrated with teachback for how to put on / take off safety gait belt. Affirmed patient's plan for initially performing sponge offs, progression with showering with home health therapy. End of session, up in chair with needs in reach. No further inquiries.     Patient left up in chair with all lines intact and nursing notified    GOALS:   Multidisciplinary Problems       Occupational  Therapy Goals          Problem: Occupational Therapy    Goal Priority Disciplines Outcome Interventions   Occupational Therapy Goal     OT, PT/OT Progressing    Description: Goals to be met by: 6/4/2025     Patient will increase functional independence with ADLs by performing:    LE Dressing with Stand-by Assistance with use of adaptive equipment or with support of care partner while maintaining precautions.  Toileting from toilet with Stand-by Assistance for hygiene and clothing management.   Patient will complete functional mobility to / from bathroom inclusive of Toilet transfer to toilet with Stand-by Assistance with use of rolling walker.  100% reciprocation of 3/3 hip precautions, DME recommendations, and ADL/task modifications post R hip revision to support safe d/c at highest level of function.                                  Time Tracking:     OT Date of Treatment: 05/15/25  OT Start Time: 1141  OT Stop Time: 1204  OT Total Time (min): 23 min    Billable Minutes:Self Care/Home Management 15 min  Therapeutic Activity 8 min    OT/RADHA: OT          5/15/2025

## 2025-05-15 NOTE — PLAN OF CARE
Functionally safe to discharge home with support of significant other. Progressed to functional mobility with rolling walker with SBA and toileting in true bathroom with SBA. Significant other present / participatory and competent in caregiver ed/training for assisting patient in LB dressing while enabling patient to maintain hip precautions (patient and significant other not interested in use of hip kit/adaptive equipment). Recommend low intensity therapy.    Problem: Occupational Therapy  Goal: Occupational Therapy Goal  Description: Goals to be met by: 6/4/2025     Patient will increase functional independence with ADLs by performing:    LE Dressing with Stand-by Assistance with use of adaptive equipment or with support of care partner while maintaining precautions.  Toileting from toilet with Stand-by Assistance for hygiene and clothing management.   Patient will complete functional mobility to / from bathroom inclusive of Toilet transfer to toilet with Stand-by Assistance with use of rolling walker.  100% reciprocation of 3/3 hip precautions, DME recommendations, and ADL/task modifications post R hip revision to support safe d/c at highest level of function.           Outcome: Progressing

## 2025-05-29 ENCOUNTER — HOSPITAL ENCOUNTER (OUTPATIENT)
Facility: HOSPITAL | Age: 55
Discharge: HOME | End: 2025-05-29
Attending: ORTHOPAEDIC SURGERY
Payer: MEDICAID

## 2025-05-29 ENCOUNTER — TELEPHONE (OUTPATIENT)
Dept: ORTHOPEDICS | Facility: CLINIC | Age: 55
End: 2025-05-29

## 2025-05-29 ENCOUNTER — OFFICE VISIT (OUTPATIENT)
Dept: ORTHOPEDICS | Facility: CLINIC | Age: 55
End: 2025-05-29
Attending: ORTHOPAEDIC SURGERY
Payer: MEDICAID

## 2025-05-29 DIAGNOSIS — Z96.649 S/P REVISION OF TOTAL HIP: ICD-10-CM

## 2025-05-29 DIAGNOSIS — G89.18 POSTOPERATIVE PAIN: ICD-10-CM

## 2025-05-29 DIAGNOSIS — G89.29 CHRONIC HIP PAIN AFTER TOTAL REPLACEMENT OF RIGHT HIP JOINT: ICD-10-CM

## 2025-05-29 DIAGNOSIS — M25.551 CHRONIC HIP PAIN AFTER TOTAL REPLACEMENT OF RIGHT HIP JOINT: ICD-10-CM

## 2025-05-29 DIAGNOSIS — Z96.649 S/P REVISION OF TOTAL HIP: Primary | ICD-10-CM

## 2025-05-29 DIAGNOSIS — Z96.641 CHRONIC HIP PAIN AFTER TOTAL REPLACEMENT OF RIGHT HIP JOINT: ICD-10-CM

## 2025-05-29 PROCEDURE — 73502 X-RAY EXAM HIP UNI 2-3 VIEWS: CPT | Mod: TC,PN,RT

## 2025-05-29 PROCEDURE — 99213 OFFICE O/P EST LOW 20 MIN: CPT | Mod: PBBFAC,25,PN | Performed by: ORTHOPAEDIC SURGERY

## 2025-05-29 PROCEDURE — 73502 X-RAY EXAM HIP UNI 2-3 VIEWS: CPT | Mod: 26,RT,, | Performed by: RADIOLOGY

## 2025-05-29 PROCEDURE — 3044F HG A1C LEVEL LT 7.0%: CPT | Mod: CPTII,,, | Performed by: ORTHOPAEDIC SURGERY

## 2025-05-29 PROCEDURE — 99999 PR PBB SHADOW E&M-EST. PATIENT-LVL III: CPT | Mod: PBBFAC,,, | Performed by: ORTHOPAEDIC SURGERY

## 2025-05-29 PROCEDURE — 1159F MED LIST DOCD IN RCRD: CPT | Mod: CPTII,,, | Performed by: ORTHOPAEDIC SURGERY

## 2025-05-29 PROCEDURE — 99024 POSTOP FOLLOW-UP VISIT: CPT | Mod: ,,, | Performed by: ORTHOPAEDIC SURGERY

## 2025-05-29 PROCEDURE — 1160F RVW MEDS BY RX/DR IN RCRD: CPT | Mod: CPTII,,, | Performed by: ORTHOPAEDIC SURGERY

## 2025-05-29 RX ORDER — OXYCODONE AND ACETAMINOPHEN 5; 325 MG/1; MG/1
1 TABLET ORAL EVERY 8 HOURS PRN
Qty: 45 TABLET | Refills: 0 | Status: SHIPPED | OUTPATIENT
Start: 2025-05-29 | End: 2025-05-29 | Stop reason: ALTCHOICE

## 2025-05-29 RX ORDER — HYDROCODONE BITARTRATE AND ACETAMINOPHEN 7.5; 325 MG/1; MG/1
1 TABLET ORAL EVERY 8 HOURS PRN
Qty: 45 TABLET | Refills: 0 | Status: SHIPPED | OUTPATIENT
Start: 2025-05-29

## 2025-05-29 NOTE — PROGRESS NOTES
Subjective:      Patient ID: Diana Sesay is a 55 y.o. female.    Chief Complaint: Pain and Post-op Evaluation of the Right Hip and Post-op Evaluation (2 week p/o- rt hip revision )      HPI:  Two weeks postop  The patient is seen for postop follow-up of right  revision acetabulum  Pain control has been satisfactory  They feel that they are ambulating easily  Postoperative complaints include:  Dependent edema, relieved by rest and elevation    Current Medications[1]  Review of patient's allergies indicates:   Allergen Reactions    Sudafed cold-allergy        There were no vitals taken for this visit.    ROS        Objective:    Ortho Exam          Alert, oriented, no acute distress  Sclera-Normal  Respiratory distress-none  Gait:  Minimal limp with walker  Wound:  Healing cleanly  Range of motion:  Painless  Distal perfusion:  Intact  Distal neurologic:  Intact  No distal edema at this time    Imaging:  Right hip radiographs show a well-positioned and fixed total hip implant with concentric reduction      Assessment:             1. S/P revision of total hip    2. Chronic hip pain after total replacement of right hip joint    3. Postoperative pain        The patient is recovering as expected from the procedure.  There is no evidence of surgical site complication        Plan:       Orders Placed This Encounter    oxyCODONE-acetaminophen (PERCOCET) 5-325 mg per tablet     No follow-ups on file.    I explained my assessment, and I reviewed the natural history of recovery from this procedure.  Appropriate progression of physical activity, exercise and therapy was discussed.    The patient prefers to do a self-directed rehab program.  I reviewed my usual instructions for this.    The patient will have an in office follow-up at 6 weeks postop.  As long as recovery is proceeding as expected, she can do an audio follow-up with me at 3 months.               [1]   Current Outpatient Medications:     aspirin 81 MG Chew,  Take 1 tablet (81 mg total) by mouth once daily., Disp: , Rfl:     atorvastatin (LIPITOR) 20 MG tablet, Take 1 tablet every day by oral route at bedtime for 90 days., Disp: , Rfl:     clonazePAM (KLONOPIN) 1 MG tablet, Take 1 tablet by mouth 3 (three) times daily., Disp: , Rfl:     cyclobenzaprine (FLEXERIL) 10 MG tablet, Take 10 mg by mouth 2 (two) times daily as needed., Disp: , Rfl:     ENBRACE HR 1.5 mg iron- 8.73 mg-6.4 mg capsule, Take 1 capsule by mouth once daily., Disp: , Rfl:     loratadine (CLARITIN) 10 mg tablet, Take 1 tablet by mouth once daily., Disp: , Rfl:     meloxicam (MOBIC) 15 MG tablet, Take 15 mg by mouth., Disp: , Rfl:     montelukast (SINGULAIR) 10 mg tablet, Take 10 mg by mouth., Disp: , Rfl:     pantoprazole (PROTONIX) 40 MG tablet, Take 1 tablet every day by oral route as directed for 30 days., Disp: , Rfl:     QUEtiapine (SEROQUEL) 25 MG Tab, Take 25 mg by mouth every evening., Disp: , Rfl:     cetirizine (ZYRTEC) 10 MG tablet, Take 1 tablet (10 mg total) by mouth every evening., Disp: 30 tablet, Rfl: 0    oxyCODONE-acetaminophen (PERCOCET) 5-325 mg per tablet, Take 1 tablet by mouth every 8 (eight) hours as needed for Pain., Disp: 45 tablet, Rfl: 0

## 2025-05-29 NOTE — TELEPHONE ENCOUNTER
Left message to contact clinic re: pain medication change- per MD. As he can change if she prefers, new script can go to her local pharmacy, but she will need to give pharmacy the Percocet for proper disposal.

## 2025-05-29 NOTE — TELEPHONE ENCOUNTER
----- Message from Sunny sent at 5/29/2025 11:56 AM CDT -----  Pt Note to DoctorNamita called: ptWho called for pt:Best call back #: [phone] notes: pt said Dr. Dean called her and wanted her permission to send her medication to her pharmacy- [facility] in [place]; pt said it's okay to send there

## 2025-06-23 ENCOUNTER — TELEPHONE (OUTPATIENT)
Dept: ORTHOPEDICS | Facility: CLINIC | Age: 55
End: 2025-06-23
Payer: MEDICAID

## 2025-06-23 NOTE — TELEPHONE ENCOUNTER
Copied from CRM #[id number]. Topic: General Inquiry - Patient Advice  >> Jun 23, 2025  8:17 AM  wrote:  Returned pt call ,pt states she lives 3 hours away from clinic and  won't be able to make this appointment . Offered pt virtual appointment on this date . She agreed and gave verbal understanding.call ended Type:  Needs Medical Advice    Who Called: Diana  Would the patient rather a call back or a response via [facility]? Call  Best Call Back Number: [phone]  Additional Information:  Patient is calling in ref [facility] appt on 6/25. Patient can't make a ppt. Nees advice as to what she should do. Patent is wanting to wait until August but this is a 6 wk checkup she is cancelling.

## 2025-06-25 ENCOUNTER — OFFICE VISIT (OUTPATIENT)
Dept: ORTHOPEDICS | Facility: CLINIC | Age: 55
End: 2025-06-25
Payer: MEDICAID

## 2025-06-25 ENCOUNTER — TELEPHONE (OUTPATIENT)
Dept: ORTHOPEDICS | Facility: CLINIC | Age: 55
End: 2025-06-25
Payer: MEDICAID

## 2025-06-25 DIAGNOSIS — Z96.649 S/P REVISION OF TOTAL HIP: Primary | ICD-10-CM

## 2025-06-25 NOTE — PROGRESS NOTES
Audio Only Telehealth Visit  The patient location is: [place], [place]  The chief complaint leading to consultation is: 6 wk postop appointment  Visit type: Virtual visit with audio only (telephone)  Total time spent in medical discussion with patient: 15 minutes  Total time spent on date of the encounter: 30 minutes    The reason for the audio only service rather than synchronous audio and video virtual visit was related to technical difficulties or patient preference/necessity.    Each patient to whom I provide medical services by telemedicine is:  (1) informed of the relationship between the physician and patient and the respective role of any other health care provider with respect to management of the patient; and (2) notified that they may decline to receive medical services by telemedicine and may withdraw from such care at any time. Patient verbally consented to receive this service via voice-only telephone call.    HPI:   Ms. Sesay presents via virtual appointment today.  She is 6 weeks status post revision right acetabulum.  Date of surgery: 05/14/2025  Surgeon: Dr. Dean    Patient last saw Dr. Dean at her 2 week postop appointment, where she was following a normal postoperative course complaining of some intermittent dependent edema.  Images taken at that time showed a well-positioned and fixed hip implant.    Patient was asked to present in-person for a 6 week appointment, but transitioned this appointment to a virtual due to transportation issues.    Today, she reports now ambulating without a walker.  She states that her limp and hip pain have completely been resolved.  She reports walking long distances without difficulty.  Denies any healing concerns in regards to surgical incision site.     Assessment and plan:    1. S/P revision of total hip          Discussion:  Patient appears to be following a normal postoperative course and recovering very well following total hip arthroplasty.  We  reviewed continued posterior hip precautions for 6 more weeks.    Though all reports were positive today, I did discuss with the patient if she has any concerns or specific questions in the upcoming weeks we will need to see her in person to evaluate her.    Follow Up: audio appt scheduled with Dr. Dean at the 3 month postop     This service was not originating from a related E/M service provided within the previous 7 days nor will  to an E/M service or procedure within the next 24 hours or my soonest available appointment.  Prevailing standard of care was able to be met in this audio-only visit.

## 2025-06-25 NOTE — TELEPHONE ENCOUNTER
Copied from CRM #[id number]. Topic: General Inquiry - Return Call  >> Jun 25, 2025  9:27 AM Monica wrote:  Provider returned pt call from missed audio appointment .   Type:  Patient Returning Call    Who Called:Pt   Who Left Message for Patient:Ladonna  Does the patient know what this is regarding?:yes   Would the patient rather a call back or a response via [facility]? Call back   Best Call Back Number:[phone]  Additional Information:

## 2025-07-07 ENCOUNTER — DOCUMENT SCAN (OUTPATIENT)
Dept: HOME HEALTH SERVICES | Facility: HOSPITAL | Age: 55
End: 2025-07-07
Payer: MEDICAID

## 2025-08-14 ENCOUNTER — OFFICE VISIT (OUTPATIENT)
Dept: ORTHOPEDICS | Facility: CLINIC | Age: 55
End: 2025-08-14
Payer: MEDICAID

## 2025-08-14 DIAGNOSIS — M25.551 CHRONIC HIP PAIN AFTER TOTAL REPLACEMENT OF RIGHT HIP JOINT: Primary | ICD-10-CM

## 2025-08-14 DIAGNOSIS — Z96.649 S/P REVISION OF TOTAL HIP: ICD-10-CM

## 2025-08-14 DIAGNOSIS — G89.29 CHRONIC HIP PAIN AFTER TOTAL REPLACEMENT OF RIGHT HIP JOINT: Primary | ICD-10-CM

## 2025-08-14 DIAGNOSIS — Z96.641 CHRONIC HIP PAIN AFTER TOTAL REPLACEMENT OF RIGHT HIP JOINT: Primary | ICD-10-CM

## 2025-08-19 DIAGNOSIS — Z12.4 ENCOUNTER FOR SCREENING FOR MALIGNANT NEOPLASM OF CERVIX: Primary | ICD-10-CM

## 2025-08-20 DIAGNOSIS — Z12.31 OTHER SCREENING MAMMOGRAM: Primary | ICD-10-CM

## (undated) DEVICE — DRAPE INCISE IOBAN 2 23X23IN

## (undated) DEVICE — PACK SURGERY START

## (undated) DEVICE — SUT STRATAFIX PDS 1 CTX 18IN

## (undated) DEVICE — COVER MAYO STND XL 30X57IN

## (undated) DEVICE — SUT CTD VICRYL 1 UND BR CT

## (undated) DEVICE — GLOVE SIGNATURE ESSNTL LTX 8.5

## (undated) DEVICE — SUT VICRYL PLUS CT-1 1 8-27IN

## (undated) DEVICE — BLADE SURG CARBON STEEL #10

## (undated) DEVICE — 6.5MM LOW PROFILE HEX SCREW 25MM
Type: IMPLANTABLE DEVICE | Site: HIP | Status: NON-FUNCTIONAL
Brand: TRIDENT II
Removed: 2025-05-14

## (undated) DEVICE — INTERPULSE SET

## (undated) DEVICE — GLOVE BIOGEL PIMICRO INDIC 6.5

## (undated) DEVICE — BLADE SAW SAG 25.40MM X 1.27MM

## (undated) DEVICE — SYR 10CC LUER LOCK

## (undated) DEVICE — DRESSING AQUACEL AG ADV 3.5X12

## (undated) DEVICE — ELECTRODE REM PLYHSV RETURN 9

## (undated) DEVICE — GOWN POLY REINF BRTH SLV LG

## (undated) DEVICE — MIXER BONE CEMENT

## (undated) DEVICE — CAUTERY TIP POLISHER

## (undated) DEVICE — GLOVE SIGNATURE ESSNTL LTX 8

## (undated) DEVICE — SPONGE LAP 18X18 PREWASHED

## (undated) DEVICE — TOWEL OR XRAY BLUE 17X26IN

## (undated) DEVICE — DRAPE HIP PCH 112X137X89IN

## (undated) DEVICE — PILLOW ABDUCTION FOAM MED

## (undated) DEVICE — GLOVE SURGICAL LATEX SZ 8

## (undated) DEVICE — STAPLER SKIN ROTATING HEAD

## (undated) DEVICE — SOCKINETTE IMPERVIOUS 12X48IN

## (undated) DEVICE — COVER BACK TBL HD 2-TIER 72IN

## (undated) DEVICE — NDL HYPO STD REG BVL 18GX1.5IN

## (undated) DEVICE — SUPPORT ULNA NERVE PROTECTOR

## (undated) DEVICE — GLOVE BIOGEL SKINSENSE PI 6.0

## (undated) DEVICE — DRAPE TOP 53X102IN

## (undated) DEVICE — APPLICATOR CHLORAPREP ORN 26ML

## (undated) DEVICE — DRAPE U SPLIT SHEET 54X76IN

## (undated) DEVICE — MANIFOLD 4 PORT

## (undated) DEVICE — DRAPE THREE-QTR REINF 53X77IN

## (undated) DEVICE — DRAPE STERI U-SHAPED 47X51IN

## (undated) DEVICE — COVER OVERHEAD SURG LT BLUE

## (undated) DEVICE — GLOVE BIOGEL SZ 8 1/2

## (undated) DEVICE — BNDG COFLEX FOAM LF2 ST 6X5YD

## (undated) DEVICE — HOOD T7 W/ PEEL AWAY LENS

## (undated) DEVICE — PACK ECLIPSE BASIC III SURG

## (undated) DEVICE — SUT STRATAFIX 3-0 SH 8IN

## (undated) DEVICE — GOWN POLY REINF BRTH SLV XL

## (undated) DEVICE — SUT 2/0 36IN COATED VICRYL

## (undated) DEVICE — DRAPE STERI-DRAPE 1000 17X11IN

## (undated) DEVICE — SUT CTD VICRYL CT-1 27

## (undated) DEVICE — DRESSING AQUACEL SACRAL 9 X 9

## (undated) DEVICE — DRAPE STERI INSTRUMENT 1018